# Patient Record
Sex: FEMALE | Race: WHITE | NOT HISPANIC OR LATINO | Employment: OTHER | URBAN - METROPOLITAN AREA
[De-identification: names, ages, dates, MRNs, and addresses within clinical notes are randomized per-mention and may not be internally consistent; named-entity substitution may affect disease eponyms.]

---

## 2018-05-04 LAB — HCV AB SER-ACNC: NEGATIVE

## 2021-04-15 PROBLEM — Z00.00 HEALTHCARE MAINTENANCE: Status: ACTIVE | Noted: 2021-04-15

## 2021-04-16 ENCOUNTER — OFFICE VISIT (OUTPATIENT)
Dept: FAMILY MEDICINE CLINIC | Facility: CLINIC | Age: 66
End: 2021-04-16
Payer: COMMERCIAL

## 2021-04-16 VITALS
WEIGHT: 125 LBS | SYSTOLIC BLOOD PRESSURE: 118 MMHG | BODY MASS INDEX: 20.09 KG/M2 | DIASTOLIC BLOOD PRESSURE: 70 MMHG | TEMPERATURE: 98 F | HEIGHT: 66 IN | HEART RATE: 72 BPM

## 2021-04-16 DIAGNOSIS — D51.9 ANEMIA DUE TO VITAMIN B12 DEFICIENCY, UNSPECIFIED B12 DEFICIENCY TYPE: ICD-10-CM

## 2021-04-16 DIAGNOSIS — Z12.31 ENCOUNTER FOR SCREENING MAMMOGRAM FOR BREAST CANCER: ICD-10-CM

## 2021-04-16 DIAGNOSIS — E03.9 ACQUIRED HYPOTHYROIDISM: Primary | ICD-10-CM

## 2021-04-16 DIAGNOSIS — Z11.59 NEED FOR HEPATITIS C SCREENING TEST: ICD-10-CM

## 2021-04-16 DIAGNOSIS — E78.2 MIXED HYPERLIPIDEMIA: ICD-10-CM

## 2021-04-16 DIAGNOSIS — Z11.4 SCREENING FOR HIV (HUMAN IMMUNODEFICIENCY VIRUS): ICD-10-CM

## 2021-04-16 DIAGNOSIS — Z12.11 COLON CANCER SCREENING: ICD-10-CM

## 2021-04-16 DIAGNOSIS — Z01.419 ENCOUNTER FOR GYNECOLOGICAL EXAMINATION: ICD-10-CM

## 2021-04-16 DIAGNOSIS — M79.10 MYALGIA: ICD-10-CM

## 2021-04-16 DIAGNOSIS — Z78.0 MENOPAUSE: ICD-10-CM

## 2021-04-16 DIAGNOSIS — Z00.00 HEALTHCARE MAINTENANCE: ICD-10-CM

## 2021-04-16 PROCEDURE — 99203 OFFICE O/P NEW LOW 30 MIN: CPT | Performed by: PHYSICIAN ASSISTANT

## 2021-04-16 NOTE — ASSESSMENT & PLAN NOTE
Patient is on Lipitor 10 mg once daily  Her last blood work was in August of last year and therefore we will order fasting lipid panel and CMP

## 2021-04-16 NOTE — PATIENT INSTRUCTIONS
Problem List Items Addressed This Visit        Endocrine    Acquired hypothyroidism - Primary       Other    Hyperlipidemia    Vitamin B12 deficiency anemia    Healthcare maintenance      Other Visit Diagnoses     Myalgia        Menopause        Screening for HIV (human immunodeficiency virus)        Encounter for screening mammogram for breast cancer        Need for hepatitis C screening test

## 2021-04-16 NOTE — PROGRESS NOTES
Assessment and Plan:   I did explain to the patient that when patients are on a statin we check blood work every 6 months  She states her insurance does not cover this but this is likely secondary to miss coating in the past and I am recommending blood work now and again 6 months  Review blood work results initially and then place new orders for 6 months  Problem List Items Addressed This Visit        Endocrine    Acquired hypothyroidism - Primary       Patient is on levothyroxine 75 mg once daily  Her last thyroid was normal August   Repeat TSH  Relevant Orders    TSH, 3rd generation with Free T4 reflex       Other    Hyperlipidemia       Patient is on Lipitor 10 mg once daily  Her last blood work was in August of last year and therefore we will order fasting lipid panel and CMP  Relevant Orders    Comprehensive metabolic panel    Lipid Panel with Direct LDL reflex    Vitamin B12 deficiency anemia       Patient is on oral vitamin-B supplementation  Check vitamin B12 and CBC levels  Relevant Orders    CBC and differential    Vitamin B12    Healthcare maintenance       Patient should have DEXA  Mammogram is up-to-date but due this year  Colon screening done with the fit test through [facility] in May of last year  Patient due for Pap and COVID vaccination  Check vitamin-D level              Other Visit Diagnoses     Myalgia        Relevant Orders    Vitamin D 25 hydroxy    Menopause        Relevant Orders    DXA bone density spine hip and pelvis    Screening for HIV (human immunodeficiency virus)        Relevant Orders    HIV 1/2 Antigen/Antibody (4th Generation) w Reflex [facility]    Encounter for screening mammogram for breast cancer        Relevant Orders    Mammo screening bilateral w 3d & cad    Need for hepatitis C screening test        Colon cancer screening        Relevant Orders    Cologuard    Encounter for gynecological examination        Relevant Orders Ambulatory referral to Gynecology                 Diagnoses and all orders for this visit:    Acquired hypothyroidism  -     TSH, 3rd generation with Free T4 reflex    Mixed hyperlipidemia  -     Comprehensive metabolic panel  -     Lipid Panel with Direct LDL reflex    Anemia due to vitamin B12 deficiency, unspecified B12 deficiency type  -     CBC and differential  -     Vitamin B12; Future    Myalgia  -     Vitamin D 25 hydroxy    Menopause  -     DXA bone density spine hip and pelvis; Future    Screening for HIV (human immunodeficiency virus)  -     HIV 1/2 Antigen/Antibody (4th Generation) w Reflex [facility]; Future    Encounter for screening mammogram for breast cancer  -     Mammo screening bilateral w 3d & cad; Future    Healthcare maintenance    Need for hepatitis C screening test    Colon cancer screening  -     Cologuard; Future    Encounter for gynecological examination  -     Ambulatory referral to Gynecology; Future              Subjective:      Patient ID: Bre Oleary is a 63 y o  female  CC:    Chief Complaint   Patient presents with   Dulce Donis Establish Care     Pt here to establish care with new PCP  She states she has no particular concerns at this time  - [facility]       HPI:      Patient here today as a new patient to be established  She has a history of hypothyroidism and hyperlipidemia on Lipitor as well as vitamin B12 deficiency  It does look like she is due for Pap and COVID vaccinations  She did have blood work in October of 2020 showing normal CBC TSH LDL of 97 and a CMP  Mammogram is up-to-date May of last year fit test was negative through [facility]  She is here today accompanied by her mother  I do not see a prior DEXA scan for check osteoporosis        The following portions of the patient's history were reviewed and updated as appropriate: allergies, current medications, past family history, past medical history, past social history, past surgical history and problem list       Review of Systems   Constitutional: Negative  HENT: Negative  Eyes: Negative  Respiratory: Negative  Cardiovascular: Negative  Gastrointestinal: Negative  Endocrine: Negative  Genitourinary: Negative  Musculoskeletal: Negative  Skin: Negative  Allergic/Immunologic: Negative  Neurological: Negative  Hematological: Negative  Psychiatric/Behavioral: Negative  Data to review:       Objective:    Vitals:    04/16/21 0908   BP: 118/70   BP Location: Left arm   Patient Position: Sitting   Cuff Size: Standard   Pulse: 72   Temp: 98 °F (36 7 °C)   TempSrc: Oral   Weight: 56 7 kg (125 lb)   Height: 5' 5 75" (1 67 m)        Physical Exam  Vitals signs and nursing note reviewed  Constitutional:       Appearance: Normal appearance  She is well-developed  HENT:      Head: Normocephalic and atraumatic  Eyes:      General: Lids are normal       Conjunctiva/sclera: Conjunctivae normal       Pupils: Pupils are equal, round, and reactive to light  Cardiovascular:      Rate and Rhythm: Normal rate and regular rhythm  Heart sounds: No murmur  Pulmonary:      Effort: Pulmonary effort is normal       Breath sounds: Normal breath sounds  Skin:     General: Skin is warm and dry  Neurological:      General: No focal deficit present  Mental Status: She is alert  Coordination: Coordination is intact  Psychiatric:         Mood and Affect: Mood normal          Behavior: Behavior normal  Behavior is cooperative  Thought Content:  Thought content normal          Judgment: Judgment normal

## 2021-04-19 ENCOUNTER — TELEPHONE (OUTPATIENT)
Dept: ADMINISTRATIVE | Facility: OTHER | Age: 66
End: 2021-04-19

## 2021-04-19 NOTE — TELEPHONE ENCOUNTER
----- Message from Cameron Escamilla sent at 4/16/2021  4:02 PM EDT -----  Regarding: colon cancer screen / Nita Said primary care  04/16/21 4:03 PM    Hello, our patient Magy Morris has had CRC: FIT/FOBTx3 completed/performed  Please assist in updating the patient chart by pulling the Care Everywhere (CE) document  The date of service is 5/10/2020       Thank you,  Cameron Escamilla  [facility] [address]

## 2021-04-19 NOTE — TELEPHONE ENCOUNTER
----- Message from Iris Bell sent at 4/16/2021  4:04 PM EDT -----  Regarding: hep c screening / Abdulaziz Phelan primary care  04/16/21 4:04 PM    Hello, our patient Génesis Snyder has had Hepatitis C completed/performed  Please assist in updating the patient chart by pulling the Care Everywhere (CE) document  The date of service is 5/2018       Thank you,  Iris BEATTY [facility] AT [address]

## 2021-04-19 NOTE — TELEPHONE ENCOUNTER
Upon review of the In Basket request we were able to locate, review, and update the patient chart as requested for CRC: FIT/FOBT (3) and Hepatitis C   Any additional questions or concerns should be emailed to the Practice Liaisons via [email or web address] email, please do not reply via In Basket      Thank you  Elham Guardado MA

## 2021-08-23 DIAGNOSIS — E78.2 MIXED HYPERLIPIDEMIA: Primary | ICD-10-CM

## 2021-08-23 RX ORDER — ATORVASTATIN CALCIUM 10 MG/1
10 TABLET, FILM COATED ORAL DAILY
Qty: 30 TABLET | Refills: 1 | Status: SHIPPED | OUTPATIENT
Start: 2021-08-23 | End: 2021-10-21

## 2021-10-20 LAB
25(OH)D3 SERPL-MCNC: 39 NG/ML (ref 30–100)
ALBUMIN SERPL-MCNC: 4.2 G/DL (ref 3.6–5.1)
ALBUMIN/GLOB SERPL: 1.5 (CALC) (ref 1–2.5)
ALP SERPL-CCNC: 92 U/L (ref 37–153)
ALT SERPL-CCNC: 11 U/L (ref 6–29)
AST SERPL-CCNC: 16 U/L (ref 10–35)
BASOPHILS # BLD AUTO: 52 CELLS/UL (ref 0–200)
BASOPHILS NFR BLD AUTO: 0.8 %
BILIRUB SERPL-MCNC: 0.7 MG/DL (ref 0.2–1.2)
BUN SERPL-MCNC: 14 MG/DL (ref 7–25)
BUN/CREAT SERPL: NORMAL (CALC) (ref 6–22)
CALCIUM SERPL-MCNC: 9.3 MG/DL (ref 8.6–10.4)
CHLORIDE SERPL-SCNC: 104 MMOL/L (ref 98–110)
CHOLEST SERPL-MCNC: 178 MG/DL
CHOLEST/HDLC SERPL: 2.7 (CALC)
CO2 SERPL-SCNC: 32 MMOL/L (ref 20–32)
CREAT SERPL-MCNC: 0.55 MG/DL (ref 0.5–0.99)
EOSINOPHIL # BLD AUTO: 260 CELLS/UL (ref 15–500)
EOSINOPHIL NFR BLD AUTO: 4 %
ERYTHROCYTE [DISTWIDTH] IN BLOOD BY AUTOMATED COUNT: 12.1 % (ref 11–15)
GLOBULIN SER CALC-MCNC: 2.8 G/DL (CALC) (ref 1.9–3.7)
GLUCOSE SERPL-MCNC: 95 MG/DL (ref 65–99)
HCT VFR BLD AUTO: 43.9 % (ref 35–45)
HDLC SERPL-MCNC: 66 MG/DL
HGB BLD-MCNC: 14.7 G/DL (ref 11.7–15.5)
HIV 1+2 AB+HIV1 P24 AG SERPL QL IA: NORMAL
LDLC SERPL CALC-MCNC: 94 MG/DL (CALC)
LYMPHOCYTES # BLD AUTO: 1255 CELLS/UL (ref 850–3900)
LYMPHOCYTES NFR BLD AUTO: 19.3 %
MCH RBC QN AUTO: 30.4 PG (ref 27–33)
MCHC RBC AUTO-ENTMCNC: 33.5 G/DL (ref 32–36)
MCV RBC AUTO: 90.9 FL (ref 80–100)
MONOCYTES # BLD AUTO: 702 CELLS/UL (ref 200–950)
MONOCYTES NFR BLD AUTO: 10.8 %
NEUTROPHILS # BLD AUTO: 4232 CELLS/UL (ref 1500–7800)
NEUTROPHILS NFR BLD AUTO: 65.1 %
NONHDLC SERPL-MCNC: 112 MG/DL (CALC)
PLATELET # BLD AUTO: 309 THOUSAND/UL (ref 140–400)
PMV BLD REES-ECKER: 9.5 FL (ref 7.5–12.5)
POTASSIUM SERPL-SCNC: 3.7 MMOL/L (ref 3.5–5.3)
PROT SERPL-MCNC: 7 G/DL (ref 6.1–8.1)
RBC # BLD AUTO: 4.83 MILLION/UL (ref 3.8–5.1)
SL AMB EGFR AFRICAN AMERICAN: 115 ML/MIN/1.73M2
SL AMB EGFR NON AFRICAN AMERICAN: 99 ML/MIN/1.73M2
SODIUM SERPL-SCNC: 143 MMOL/L (ref 135–146)
TRIGL SERPL-MCNC: 89 MG/DL
TSH SERPL-ACNC: 0.89 MIU/L (ref 0.4–4.5)
VIT B12 SERPL-MCNC: 585 PG/ML (ref 200–1100)
WBC # BLD AUTO: 6.5 THOUSAND/UL (ref 3.8–10.8)

## 2021-10-25 ENCOUNTER — OFFICE VISIT (OUTPATIENT)
Dept: FAMILY MEDICINE CLINIC | Facility: CLINIC | Age: 66
End: 2021-10-25
Payer: COMMERCIAL

## 2021-10-25 VITALS
OXYGEN SATURATION: 97 % | BODY MASS INDEX: 21.33 KG/M2 | HEIGHT: 65 IN | HEART RATE: 84 BPM | DIASTOLIC BLOOD PRESSURE: 62 MMHG | SYSTOLIC BLOOD PRESSURE: 128 MMHG | WEIGHT: 128 LBS

## 2021-10-25 DIAGNOSIS — E03.9 ACQUIRED HYPOTHYROIDISM: Primary | ICD-10-CM

## 2021-10-25 DIAGNOSIS — D51.9 ANEMIA DUE TO VITAMIN B12 DEFICIENCY, UNSPECIFIED B12 DEFICIENCY TYPE: ICD-10-CM

## 2021-10-25 DIAGNOSIS — E78.2 MIXED HYPERLIPIDEMIA: ICD-10-CM

## 2021-10-25 PROCEDURE — 99214 OFFICE O/P EST MOD 30 MIN: CPT | Performed by: PHYSICIAN ASSISTANT

## 2021-10-25 RX ORDER — ATORVASTATIN CALCIUM 10 MG/1
10 TABLET, FILM COATED ORAL DAILY
Qty: 90 TABLET | Refills: 3 | Status: SHIPPED | OUTPATIENT
Start: 2021-10-25

## 2021-10-25 RX ORDER — LEVOTHYROXINE SODIUM 0.07 MG/1
75 TABLET ORAL
Qty: 90 TABLET | Refills: 3 | Status: SHIPPED | OUTPATIENT
Start: 2021-10-25

## 2022-05-02 ENCOUNTER — OFFICE VISIT (OUTPATIENT)
Dept: FAMILY MEDICINE CLINIC | Facility: CLINIC | Age: 67
End: 2022-05-02
Payer: COMMERCIAL

## 2022-05-02 VITALS
HEART RATE: 78 BPM | OXYGEN SATURATION: 95 % | BODY MASS INDEX: 21.39 KG/M2 | DIASTOLIC BLOOD PRESSURE: 66 MMHG | WEIGHT: 128.4 LBS | HEIGHT: 65 IN | SYSTOLIC BLOOD PRESSURE: 122 MMHG

## 2022-05-02 DIAGNOSIS — Z12.31 ENCOUNTER FOR SCREENING MAMMOGRAM FOR MALIGNANT NEOPLASM OF BREAST: Primary | ICD-10-CM

## 2022-05-02 DIAGNOSIS — E03.9 ACQUIRED HYPOTHYROIDISM: ICD-10-CM

## 2022-05-02 DIAGNOSIS — Z12.11 SCREENING FOR COLON CANCER: ICD-10-CM

## 2022-05-02 DIAGNOSIS — D51.9 ANEMIA DUE TO VITAMIN B12 DEFICIENCY, UNSPECIFIED B12 DEFICIENCY TYPE: ICD-10-CM

## 2022-05-02 DIAGNOSIS — E78.2 MIXED HYPERLIPIDEMIA: ICD-10-CM

## 2022-05-02 DIAGNOSIS — Z00.00 HEALTHCARE MAINTENANCE: ICD-10-CM

## 2022-05-02 PROCEDURE — 3008F BODY MASS INDEX DOCD: CPT | Performed by: PHYSICIAN ASSISTANT

## 2022-05-02 PROCEDURE — 3725F SCREEN DEPRESSION PERFORMED: CPT | Performed by: PHYSICIAN ASSISTANT

## 2022-05-02 PROCEDURE — 99214 OFFICE O/P EST MOD 30 MIN: CPT | Performed by: PHYSICIAN ASSISTANT

## 2022-05-02 NOTE — ASSESSMENT & PLAN NOTE
Patient on levothyroxine 75 mcg  TSH not done prior to today's visit reprinted order call with results if stable check in 6 months prior to appointment so we can review together

## 2022-05-02 NOTE — ASSESSMENT & PLAN NOTE
Patient thought the Cologuard test kit was to complicated is  she would rather do the fit test kit given today  Mammogram ordered  Patient does not want any gyn examinations

## 2022-05-02 NOTE — PATIENT INSTRUCTIONS
Problem List Items Addressed This Visit     None      Visit Diagnoses     Encounter for screening mammogram for malignant neoplasm of breast    -  Primary    Screening for colon cancer

## 2022-05-02 NOTE — PROGRESS NOTES
Assessment and Plan:  Patient is switching to Medicare in next month and would rather wait to do blood work until then because the cost   Call with results repeat in 6 months  Problem List Items Addressed This Visit        Endocrine    Acquired hypothyroidism     Patient on levothyroxine 75 mcg  TSH not done prior to today's visit reprinted order call with results if stable check in 6 months prior to appointment so we can review together  Relevant Orders    TSH, 3rd generation with Free T4 reflex       Other    Hyperlipidemia     Patient on Lipitor 10 mg once daily blood work was not done prior to today's visit orders were printed call with results to recheck in 6 months  Blood work is to be done before her visits  Relevant Orders    Comprehensive metabolic panel    Lipid Panel with Direct LDL reflex    Vitamin B12 deficiency anemia    Relevant Orders    CBC and differential    Healthcare maintenance     Patient thought the Cologuard test kit was to complicated is  she would rather do the fit test kit given today  Mammogram ordered  Patient does not want any gyn examinations  Other Visit Diagnoses     Encounter for screening mammogram for malignant neoplasm of breast    -  Primary    Relevant Orders    Mammo screening bilateral w 3d & cad    Screening for colon cancer        Relevant Orders    Occult Blood, Fecal Immunochemical                 Diagnoses and all orders for this visit:    Encounter for screening mammogram for malignant neoplasm of breast  -     Mammo screening bilateral w 3d & cad; Future    Screening for colon cancer  -     Occult Blood, Fecal Immunochemical; Future    Acquired hypothyroidism  -     TSH, 3rd generation with Free T4 reflex; Future    Mixed hyperlipidemia  -     Comprehensive metabolic panel; Future  -     Lipid Panel with Direct LDL reflex;  Future    Anemia due to vitamin B12 deficiency, unspecified B12 deficiency type  -     CBC and differential; Future    Healthcare maintenance            Subjective:      Patient ID: Adria Golden is a 61 y o  female  CC:    Chief Complaint   Patient presents with    Follow-up     Patient present today for her 6 month follow up  HPI:      Adria Golden is here for chronic conditions f/u including the diagnosis of Encounter for screening mammogram for malignant neoplasm of breast  (primary encounter diagnosis)  Screening for colon cancer   Pt  states they are taking all medications as directed without complaints or side effects  Patient states she will be going on Medicare next month and had a 70 dollar bill for her blood work the last time so did not go for any blood work prior to today's visit  She would rather wait until she gets her new insurance  The following portions of the patient's history were reviewed and updated as appropriate: allergies, current medications, past family history, past medical history, past social history, past surgical history and problem list       Review of Systems   Constitutional: Negative  HENT: Negative  Eyes: Negative  Respiratory: Negative  Cardiovascular: Negative  Gastrointestinal: Negative  Endocrine: Negative  Genitourinary: Negative  Musculoskeletal: Negative  Skin: Negative  Allergic/Immunologic: Negative  Neurological: Negative  Hematological: Negative  Psychiatric/Behavioral: Negative  Data to review:       Objective:    Vitals:    05/02/22 0809   BP: 122/66   BP Location: Left arm   Patient Position: Sitting   Cuff Size: Large   Pulse: 78   SpO2: 95%   Weight: 58 2 kg (128 lb 6 4 oz)   Height: 5' 5" (1 651 m)        Physical Exam  Vitals and nursing note reviewed  Constitutional:       Appearance: Normal appearance  She is well-developed  HENT:      Head: Normocephalic and atraumatic     Eyes:      General: Lids are normal       Conjunctiva/sclera: Conjunctivae normal       Pupils: Pupils are equal, round, and reactive to light  Cardiovascular:      Rate and Rhythm: Normal rate and regular rhythm  Heart sounds: No murmur heard  Pulmonary:      Effort: Pulmonary effort is normal       Breath sounds: Normal breath sounds  Skin:     General: Skin is warm and dry  Neurological:      General: No focal deficit present  Mental Status: She is alert  Coordination: Coordination is intact  Psychiatric:         Mood and Affect: Mood normal          Behavior: Behavior normal  Behavior is cooperative  Thought Content: Thought content normal          Judgment: Judgment normal              Depression Screening and Follow-up Plan: Patient was screened for depression during today's encounter  They screened negative with a PHQ-2 score of 0

## 2022-05-02 NOTE — ASSESSMENT & PLAN NOTE
Patient on Lipitor 10 mg once daily blood work was not done prior to today's visit orders were printed call with results to recheck in 6 months  Blood work is to be done before her visits

## 2022-08-11 DIAGNOSIS — E78.2 MIXED HYPERLIPIDEMIA: ICD-10-CM

## 2022-08-11 RX ORDER — ATORVASTATIN CALCIUM 10 MG/1
10 TABLET, FILM COATED ORAL DAILY
Qty: 90 TABLET | Refills: 1 | Status: SHIPPED | OUTPATIENT
Start: 2022-08-11 | End: 2022-09-16 | Stop reason: SDUPTHER

## 2022-09-16 ENCOUNTER — OFFICE VISIT (OUTPATIENT)
Dept: FAMILY MEDICINE CLINIC | Facility: CLINIC | Age: 67
End: 2022-09-16
Payer: MEDICARE

## 2022-09-16 VITALS
WEIGHT: 127.2 LBS | TEMPERATURE: 96.8 F | HEART RATE: 70 BPM | DIASTOLIC BLOOD PRESSURE: 70 MMHG | BODY MASS INDEX: 20.44 KG/M2 | SYSTOLIC BLOOD PRESSURE: 130 MMHG | OXYGEN SATURATION: 96 % | HEIGHT: 66 IN

## 2022-09-16 DIAGNOSIS — Z23 NEED FOR PROPHYLACTIC VACCINATION AGAINST STREPTOCOCCUS PNEUMONIAE (PNEUMOCOCCUS): ICD-10-CM

## 2022-09-16 DIAGNOSIS — Z12.11 COLON CANCER SCREENING: ICD-10-CM

## 2022-09-16 DIAGNOSIS — Z12.31 ENCOUNTER FOR SCREENING MAMMOGRAM FOR MALIGNANT NEOPLASM OF BREAST: ICD-10-CM

## 2022-09-16 DIAGNOSIS — E55.9 VITAMIN D DEFICIENCY: ICD-10-CM

## 2022-09-16 DIAGNOSIS — Z12.4 SCREENING FOR CERVICAL CANCER: ICD-10-CM

## 2022-09-16 DIAGNOSIS — E03.9 ACQUIRED HYPOTHYROIDISM: Primary | ICD-10-CM

## 2022-09-16 DIAGNOSIS — E78.2 MIXED HYPERLIPIDEMIA: ICD-10-CM

## 2022-09-16 DIAGNOSIS — Z13.89 SCREENING FOR BLOOD OR PROTEIN IN URINE: ICD-10-CM

## 2022-09-16 DIAGNOSIS — Z11.1 SCREENING FOR TUBERCULOSIS: ICD-10-CM

## 2022-09-16 PROCEDURE — 90677 PCV20 VACCINE IM: CPT

## 2022-09-16 PROCEDURE — 99204 OFFICE O/P NEW MOD 45 MIN: CPT | Performed by: INTERNAL MEDICINE

## 2022-09-16 PROCEDURE — G0009 ADMIN PNEUMOCOCCAL VACCINE: HCPCS

## 2022-09-16 RX ORDER — LEVOTHYROXINE SODIUM 0.07 MG/1
75 TABLET ORAL
Qty: 90 TABLET | Refills: 3 | Status: SHIPPED | OUTPATIENT
Start: 2022-09-16

## 2022-09-16 RX ORDER — ATORVASTATIN CALCIUM 10 MG/1
10 TABLET, FILM COATED ORAL DAILY
Qty: 90 TABLET | Refills: 1 | Status: CANCELLED | OUTPATIENT
Start: 2022-09-16

## 2022-09-16 RX ORDER — ATORVASTATIN CALCIUM 10 MG/1
10 TABLET, FILM COATED ORAL DAILY
Qty: 90 TABLET | Refills: 3 | Status: SHIPPED | OUTPATIENT
Start: 2022-09-16

## 2022-09-16 NOTE — PATIENT INSTRUCTIONS
Pneumococcal 20-Valent Conjugate Vaccine (By injection)   Pneumococcal 20-Valent Vaccine, Diphtheria Conjugate ([id number]-iki-SSM-al 20-VAY-lent VAX-een, dif-THEER-ee-a [id number])  Prevents pneumonia  Brand Name(s): Prevnar 20   There may be other brand names for this medicine  When This Medicine Should Not Be Used: This vaccine is not right for everyone  You should not receive it if you had an allergic reaction to pneumococcal or diphtheria toxoid vaccine  How to Use This Medicine:   Injectable  Your doctor will prescribe your exact dose and tell you how often it should be given  This medicine is given as a shot into one of your muscles  A nurse or other health provider will give you this medicine  Drugs and Foods to Avoid:   Ask your doctor or pharmacist before using any other medicine, including over-the-counter medicines, vitamins, and herbal products  Some medicines can affect how this vaccine works  Tell your doctor if you are receiving a treatment or medicine that can weaken your immune system (including radiation treatment, steroid medicine, or cancer medicine)  Tell your doctor if you have already received another pneumococcal vaccine  Warnings While Using This Medicine:   Tell your doctor if you are pregnant or breastfeeding  Tell your doctor if you have a weak immune system  You may not be fully protected by this vaccine  Possible Side Effects While Using This Medicine:   Call your doctor right away if you notice any of these side effects: Allergic reaction: Itching or hives, swelling in your face or hands, swelling or tingling in your mouth or throat, chest tightness, trouble breathing  Fever  If you notice these less serious side effects, talk with your doctor:   Headache  Joint or muscle pain  Pain, itching, burning, swelling, or a lump under your skin where the shot was given  Tiredness  If you notice other side effects that you think are caused by this medicine, tell your doctor     Call your doctor for medical advice about side effects  You may report side effects to FDA at 0-382-FDA-4417    © Copyright [facility] 2022 Information is for End User's use only and may not be sold, redistributed or otherwise used for commercial purposes  The above information is an  only  It is not intended as medical advice for individual conditions or treatments  Talk to your doctor, nurse or pharmacist before following any medical regimen to see if it is safe and effective for you

## 2022-09-16 NOTE — PROGRESS NOTES
Assessment/Plan:    Acquired hypothyroidism  Continue current dose of levothyroxine, recheck TSH  Hyperlipidemia  Continue with atorvastatin 10 mg daily, recheck lipid panel  Diagnoses and all orders for this visit:    Acquired hypothyroidism  -     TSH, 3rd generation with Free T4 reflex; Future  -     levothyroxine 75 mcg tablet; Take 1 tablet (75 mcg total) by mouth daily in the early morning    Mixed hyperlipidemia  -     Comprehensive metabolic panel; Future  -     Lipid panel; Future  -     atorvastatin (LIPITOR) 10 mg tablet; Take 1 tablet (10 mg total) by mouth daily    Vitamin D deficiency  -     Vitamin D 25 hydroxy; Future    Screening for blood or protein in urine  -     UA (URINE) with reflex to Scope    Encounter for screening mammogram for malignant neoplasm of breast  -     Mammo screening bilateral w 3d & cad; Future    Colon cancer screening  -     Occult Blood, Fecal Immunochemical; Future    Need for prophylactic vaccination against Streptococcus pneumoniae (pneumococcus)  -     Pneumococcal Conjugate Vaccine 20-valent (Pcv20)    Screening for cervical cancer  -     Ambulatory Referral to Gynecology; Future    Screening for tuberculosis  -     XR chest pa & lateral; Future          Subjective:      Patient ID: Tony Vences is a 74 y o  female  Patient came today to establish care  She agreed for follow-up with OBGYN for cervical cancer screening  She refused colonoscopy, she would like to continue with the FIT test   She agreed for mammogram script was given  The following portions of the patient's history were reviewed and updated as appropriate: allergies, current medications, past family history, past medical history, past social history, past surgical history, and problem list     Review of Systems   Constitutional: Negative for activity change, appetite change, chills, fatigue and fever  HENT: Negative for congestion, ear pain, rhinorrhea and sore throat  Respiratory: Negative for cough, shortness of breath and wheezing  Cardiovascular: Negative for chest pain, palpitations and leg swelling  Gastrointestinal: Negative for abdominal distention, abdominal pain, diarrhea, nausea and vomiting  Genitourinary: Negative for difficulty urinating, frequency and pelvic pain  Musculoskeletal: Negative for arthralgias, back pain and neck pain  Skin: Negative for rash  Neurological: Negative for dizziness, tremors, weakness, numbness and headaches  Objective:      /70 (BP Location: Left arm, Patient Position: Sitting, Cuff Size: Adult)   Pulse 70   Temp (!) 96 8 °F (36 °C) (Temporal)   Ht 5' 5 75" (1 67 m)   Wt 57 7 kg (127 lb 3 2 oz)   SpO2 96%   BMI 20 69 kg/m²     Allergies   Allergen Reactions    Grapefruit Extract - Food Allergy Hives and Swelling    Ciprofloxacin      Temporary hearing loss    Other      Grape fruits    Penicillins Rash          Current Outpatient Medications:     atorvastatin (LIPITOR) 10 mg tablet, Take 1 tablet (10 mg total) by mouth daily, Disp: 90 tablet, Rfl: 3    cholecalciferol (VITAMIN D3) 400 units tablet, Take 400 Units by mouth daily, Disp: , Rfl:     cyanocobalamin (VITAMIN B-12) 100 mcg tablet, Take by mouth daily, Disp: , Rfl:     levothyroxine 75 mcg tablet, Take 1 tablet (75 mcg total) by mouth daily in the early morning, Disp: 90 tablet, Rfl: 3     Patient Instructions   Pneumococcal 20-Valent Conjugate Vaccine (By injection)   Pneumococcal 20-Valent Vaccine, Diphtheria Conjugate (GZR-inb-YNG-al 20-VAY-lent VAX-een, dif-THEER-ee-a [id number]-[id number])  Prevents pneumonia  Brand Name(s): Prevnar 20   There may be other brand names for this medicine  When This Medicine Should Not Be Used: This vaccine is not right for everyone  You should not receive it if you had an allergic reaction to pneumococcal or diphtheria toxoid vaccine    How to Use This Medicine:   Injectable  · Your doctor will prescribe your exact dose and tell you how often it should be given  This medicine is given as a shot into one of your muscles  · A nurse or other health provider will give you this medicine  Drugs and Foods to Avoid:   Ask your doctor or pharmacist before using any other medicine, including over-the-counter medicines, vitamins, and herbal products  · Some medicines can affect how this vaccine works  Tell your doctor if you are receiving a treatment or medicine that can weaken your immune system (including radiation treatment, steroid medicine, or cancer medicine)  · Tell your doctor if you have already received another pneumococcal vaccine  Warnings While Using This Medicine:   · Tell your doctor if you are pregnant or breastfeeding  · Tell your doctor if you have a weak immune system  You may not be fully protected by this vaccine  Possible Side Effects While Using This Medicine:   Call your doctor right away if you notice any of these side effects:  · Allergic reaction: Itching or hives, swelling in your face or hands, swelling or tingling in your mouth or throat, chest tightness, trouble breathing  · Fever  If you notice these less serious side effects, talk with your doctor:   · Headache  · Joint or muscle pain  · Pain, itching, burning, swelling, or a lump under your skin where the shot was given  · Tiredness  If you notice other side effects that you think are caused by this medicine, tell your doctor  Call your doctor for medical advice about side effects  You may report side effects to FDA at 2-508-FDA-6648    © Copyright [employer] 2022 Information is for End User's use only and may not be sold, redistributed or otherwise used for commercial purposes  The above information is an  only  It is not intended as medical advice for individual conditions or treatments  Talk to your doctor, nurse or pharmacist before following any medical regimen to see if it is safe and effective for you  Physical Exam  Constitutional:       General: She is not in acute distress  Appearance: Normal appearance  She is not ill-appearing  HENT:      Nose: No rhinorrhea  Cardiovascular:      Rate and Rhythm: Normal rate and regular rhythm  Heart sounds: No murmur heard  No friction rub  No gallop  Pulmonary:      Effort: No respiratory distress  Breath sounds: No wheezing or rhonchi  Chest:      Chest wall: No tenderness  Abdominal:      General: There is no distension  Palpations: There is no mass  Tenderness: There is no abdominal tenderness  There is no guarding or rebound  Hernia: No hernia is present  Musculoskeletal:         General: No swelling or tenderness  Lymphadenopathy:      Cervical: No cervical adenopathy  Skin:     Coloration: Skin is not jaundiced  Findings: No rash  Neurological:      Mental Status: She is alert and oriented to person, place, and time  Motor: No weakness        Gait: Gait normal    Psychiatric:         Mood and Affect: Mood normal          Behavior: Behavior normal

## 2022-10-12 PROBLEM — Z00.00 HEALTHCARE MAINTENANCE: Status: RESOLVED | Noted: 2021-04-15 | Resolved: 2022-10-12

## 2022-11-11 LAB
25(OH)D3 SERPL-MCNC: 41 NG/ML (ref 30–100)
ALBUMIN SERPL-MCNC: 4.2 G/DL (ref 3.6–5.1)
ALBUMIN/GLOB SERPL: 1.5 (CALC) (ref 1–2.5)
ALP SERPL-CCNC: 109 U/L (ref 37–153)
ALT SERPL-CCNC: 8 U/L (ref 6–29)
APPEARANCE UR: CLEAR
AST SERPL-CCNC: 15 U/L (ref 10–35)
BILIRUB SERPL-MCNC: 0.7 MG/DL (ref 0.2–1.2)
BILIRUB UR QL STRIP: NEGATIVE
BUN SERPL-MCNC: 13 MG/DL (ref 7–25)
BUN/CREAT SERPL: NORMAL (CALC) (ref 6–22)
CALCIUM SERPL-MCNC: 9.1 MG/DL (ref 8.6–10.4)
CHLORIDE SERPL-SCNC: 103 MMOL/L (ref 98–110)
CHOLEST SERPL-MCNC: 175 MG/DL
CHOLEST/HDLC SERPL: 2.9 (CALC)
CO2 SERPL-SCNC: 30 MMOL/L (ref 20–32)
COLOR UR: YELLOW
CREAT SERPL-MCNC: 0.55 MG/DL (ref 0.5–1.05)
GFR/BSA.PRED SERPLBLD CYS-BASED-ARV: 102 ML/MIN/1.73M2
GLOBULIN SER CALC-MCNC: 2.8 G/DL (CALC) (ref 1.9–3.7)
GLUCOSE SERPL-MCNC: 89 MG/DL (ref 65–99)
GLUCOSE UR QL STRIP: NEGATIVE
HDLC SERPL-MCNC: 61 MG/DL
HGB UR QL STRIP: NEGATIVE
KETONES UR QL STRIP: NEGATIVE
LDLC SERPL CALC-MCNC: 96 MG/DL (CALC)
LEUKOCYTE ESTERASE UR QL STRIP: NEGATIVE
NITRITE UR QL STRIP: NEGATIVE
NONHDLC SERPL-MCNC: 114 MG/DL (CALC)
PH UR STRIP: 7.5 [PH] (ref 5–8)
POTASSIUM SERPL-SCNC: 3.6 MMOL/L (ref 3.5–5.3)
PROT SERPL-MCNC: 7 G/DL (ref 6.1–8.1)
PROT UR QL STRIP: NEGATIVE
SODIUM SERPL-SCNC: 141 MMOL/L (ref 135–146)
SP GR UR STRIP: 1.02 (ref 1–1.03)
T4 FREE SERPL-MCNC: 1.5 NG/DL (ref 0.8–1.8)
TRIGL SERPL-MCNC: 85 MG/DL
TSH SERPL-ACNC: 0.31 MIU/L (ref 0.4–4.5)

## 2022-11-15 DIAGNOSIS — E03.9 ACQUIRED HYPOTHYROIDISM: Primary | ICD-10-CM

## 2022-11-15 RX ORDER — LEVOTHYROXINE SODIUM 0.07 MG/1
75 TABLET ORAL DAILY
Qty: 30 TABLET | Refills: 0 | Status: SHIPPED | OUTPATIENT
Start: 2022-11-15

## 2023-07-18 ENCOUNTER — OFFICE VISIT (OUTPATIENT)
Dept: FAMILY MEDICINE CLINIC | Facility: CLINIC | Age: 68
End: 2023-07-18
Payer: MEDICARE

## 2023-07-18 VITALS
TEMPERATURE: 98.7 F | OXYGEN SATURATION: 96 % | HEART RATE: 71 BPM | SYSTOLIC BLOOD PRESSURE: 122 MMHG | BODY MASS INDEX: 20.01 KG/M2 | DIASTOLIC BLOOD PRESSURE: 78 MMHG | WEIGHT: 124 LBS

## 2023-07-18 DIAGNOSIS — E03.9 ACQUIRED HYPOTHYROIDISM: Primary | ICD-10-CM

## 2023-07-18 DIAGNOSIS — E78.2 MIXED HYPERLIPIDEMIA: ICD-10-CM

## 2023-07-18 DIAGNOSIS — Z00.00 MEDICARE ANNUAL WELLNESS VISIT, INITIAL: ICD-10-CM

## 2023-07-18 DIAGNOSIS — Z78.0 ASYMPTOMATIC POSTMENOPAUSAL STATE: ICD-10-CM

## 2023-07-18 DIAGNOSIS — Z12.31 ENCOUNTER FOR SCREENING MAMMOGRAM FOR BREAST CANCER: ICD-10-CM

## 2023-07-18 PROCEDURE — 99214 OFFICE O/P EST MOD 30 MIN: CPT | Performed by: FAMILY MEDICINE

## 2023-07-18 PROCEDURE — G0402 INITIAL PREVENTIVE EXAM: HCPCS | Performed by: FAMILY MEDICINE

## 2023-07-18 NOTE — PATIENT INSTRUCTIONS
1. Acquired hypothyroidism    2. Encounter for screening mammogram for breast cancer    3.  Asymptomatic postmenopausal state

## 2023-07-18 NOTE — PROGRESS NOTES
Assessment and Plan:     Problem List Items Addressed This Visit        Endocrine    Acquired hypothyroidism - Primary    Relevant Orders    TSH, 3rd generation with Free T4 reflex       Other    Hyperlipidemia     Continue on statin recheck lipid panel in future        Other Visit Diagnoses     Medicare annual wellness visit, initial        Encounter for screening mammogram for breast cancer        Relevant Orders    Mammo screening bilateral w 3d & cad    Asymptomatic postmenopausal state        Relevant Orders    DXA bone density spine hip and pelvis           Preventive health issues were discussed with patient, and age appropriate screening tests were ordered as noted in patient's After Visit Summary. Personalized health advice and appropriate referrals for health education or preventive services given if needed, as noted in patient's After Visit Summary. Doing walking for exercise    Overall doing well screening tests ordered as well as repeat TSH     History of Present Illness:     Patient presents for a Medicare Wellness Visit    HPI     31-year-old female past medical history of hypothyroidism presenting today for follow-up and for Medicare annual wellness. Reports no changes in health since last visit. Did recommend repeat TSH in 6 weeks as she was unable to do we will order repeat today. Patient Care Team:  Jessa Zhu MD as PCP - General (Internal Medicine)     Review of Systems:     Review of Systems   Constitutional: Negative for activity change and appetite change. Respiratory: Negative for apnea, chest tightness and shortness of breath. Cardiovascular: Negative for chest pain and palpitations. Gastrointestinal: Negative for abdominal distention and abdominal pain. Musculoskeletal: Negative for arthralgias and back pain.         Problem List:     Patient Active Problem List   Diagnosis   • Adolescent idiopathic scoliosis   • Acquired hypothyroidism   • Hyperlipidemia   • Vitamin B12 deficiency anemia      Past Medical and Surgical History:     Past Medical History:   Diagnosis Date   • Hyperlipidemia    • Hypothyroid      Past Surgical History:   Procedure Laterality Date   • SPINE SURGERY      scoliosis repair with kelvin in spine - age 14      Family History:     Family History   Problem Relation Age of Onset   • Thyroid disease Mother    • Coronary artery disease Mother    • Heart disease Father    • Stroke Father       Social History:     Social History     Socioeconomic History   • Marital status: Single     Spouse name: None   • Number of children: None   • Years of education: None   • Highest education level: None   Occupational History   • None   Tobacco Use   • Smoking status: Never   • Smokeless tobacco: Never   Vaping Use   • Vaping Use: Never used   Substance and Sexual Activity   • Alcohol use: Not Currently   • Drug use: Never   • Sexual activity: None   Other Topics Concern   • None   Social History Narrative    Does not consume caffeine     Social Determinants of Health     Financial Resource Strain: Low Risk  (7/18/2023)    Overall Financial Resource Strain (CARDIA)    • Difficulty of Paying Living Expenses: Not hard at all   Food Insecurity: Not on file   Transportation Needs: No Transportation Needs (7/18/2023)    PRAPARE - Transportation    • Lack of Transportation (Medical): No    • Lack of Transportation (Non-Medical):  No   Physical Activity: Not on file   Stress: Not on file   Social Connections: Not on file   Intimate Partner Violence: Not on file   Housing Stability: Not on file      Medications and Allergies:     Current Outpatient Medications   Medication Sig Dispense Refill   • atorvastatin (LIPITOR) 10 mg tablet Take 1 tablet (10 mg total) by mouth daily 90 tablet 3   • cholecalciferol (VITAMIN D3) 400 units tablet Take 400 Units by mouth daily     • cyanocobalamin (VITAMIN B-12) 100 mcg tablet Take by mouth daily     • levothyroxine (Euthyrox) 75 mcg tablet Take 1 tablet (75 mcg total) by mouth daily 30 tablet 0     No current facility-administered medications for this visit. Allergies   Allergen Reactions   • Grapefruit Extract - Food Allergy Hives and Swelling   • Ciprofloxacin      Temporary hearing loss   • Other      Grape fruits   • Penicillins Rash      Immunizations:     Immunization History   Administered Date(s) Administered   • COVID-19 MODERNA VACC 0.5 ML IM 01/28/2021, 02/25/2021, 10/27/2021, 04/25/2022   • COVID-19 Moderna Vac BIVALENT 12 Yr+ IM (BOOSTER ONLY) 0.5 ML 10/28/2022   • INFLUENZA 01/15/2015, 11/22/2019, 09/02/2020, 10/17/2021, 12/10/2022   • Influenza Quadrivalent 3 years and older 11/22/2019, 09/02/2020   • Pneumococcal Conjugate Vaccine 20-valent (Pcv20), Polysace 09/16/2022   • Tdap 01/18/2017   • Zoster 06/27/2017   • Zoster Vaccine Recombinant 07/05/2020, 12/01/2020      Health Maintenance:         Topic Date Due   • Breast Cancer Screening: Mammogram  Never done   • Colorectal Cancer Screening  11/15/2023   • Hepatitis C Screening  Completed         Topic Date Due   • Influenza Vaccine (1) 09/01/2023      Medicare Screening Tests and Risk Assessments: Theresa Puente is here for her Welcome to Medicare visit. Health Risk Assessment:   Patient rates overall health as very good. Patient feels that their physical health rating is same. Patient is satisfied with their life. Eyesight was rated as same. Hearing was rated as same. Patient feels that their emotional and mental health rating is same. Patients states they are sometimes angry. Patient states they are sometimes unusually tired/fatigued. Pain experienced in the last 7 days has been none. Patient states that she has experienced no weight loss or gain in last 6 months. Depression Screening:   PHQ-2 Score: 0      Fall Risk Screening:    In the past year, patient has experienced: history of falling in past year    Number of falls: 1  Injured during fall?: No    Feels unsteady when standing or walking?: No    Worried about falling?: No      Urinary Incontinence Screening:   Patient has not leaked urine accidently in the last six months. Home Safety:  Patient does not have trouble with stairs inside or outside of their home. Patient has working smoke alarms and has working carbon monoxide detector. Home safety hazards include: none. Nutrition:   Current diet is Regular. Medications:   Patient is not currently taking any over-the-counter supplements. Patient is able to manage medications. Activities of Daily Living (ADLs)/Instrumental Activities of Daily Living (IADLs):   Walk and transfer into and out of bed and chair?: Yes  Dress and groom yourself?: Yes    Bathe or shower yourself?: Yes    Feed yourself? Yes  Do your laundry/housekeeping?: Yes  Manage your money, pay your bills and track your expenses?: Yes  Make your own meals?: Yes    Do your own shopping?: Yes    Previous Hospitalizations:   Any hospitalizations or ED visits within the last 12 months?: No      Advance Care Planning:   Living will: Yes    Durable POA for healthcare:  Yes    Advanced directive: Yes    Five wishes given: Yes      Cognitive Screening:   Provider or family/friend/caregiver concerned regarding cognition?: No    PREVENTIVE SCREENINGS      Cardiovascular Screening:    General: Screening Not Indicated and History Lipid Disorder      Diabetes Screening:     General: Screening Current      Colorectal Cancer Screening:     General: Screening Current      Breast Cancer Screening:     General: Risks and Benefits Discussed      Cervical Cancer Screening:    General: Patient Declines    Due for: Cervical Pap Smear      Osteoporosis Screening:    General: Risks and Benefits Discussed      Abdominal Aortic Aneurysm (AAA) Screening:        General: Screening Not Indicated      Lung Cancer Screening:     General: Screening Not Indicated      Hepatitis C Screening:    General: Screening Current      Preventive Screening Comments: Mammogram ordered, patient refereed to gyn in past recommend making appointment, dexa scan ordered has been ordered in past as well    Screening, Brief Intervention, and Referral to Treatment (SBIRT)    Screening  Typical number of drinks in a day: 0  Typical number of drinks in a week: 0  Interpretation: Low risk drinking behavior. Single Item Drug Screening:  How often have you used an illegal drug (including marijuana) or a prescription medication for non-medical reasons in the past year? never    Single Item Drug Screen Score: 0  Interpretation: Negative screen for possible drug use disorder    Vision Screening    Right eye Left eye Both eyes   Without correction 20/50 20/50 20/50   With correction 20/40 20/40 20/40        Physical Exam:     /78 (BP Location: Right arm, Patient Position: Sitting, Cuff Size: Standard)   Pulse 71   Temp 98.7 °F (37.1 °C) (Tympanic)   Wt 56.2 kg (124 lb)   SpO2 96%   BMI 20.01 kg/m²     Physical Exam  Constitutional:       Appearance: Normal appearance. Cardiovascular:      Rate and Rhythm: Normal rate and regular rhythm. Pulses: Normal pulses. Heart sounds: Normal heart sounds. Pulmonary:      Effort: Pulmonary effort is normal.      Breath sounds: Normal breath sounds. Abdominal:      General: Abdomen is flat. Tenderness: There is no abdominal tenderness. Musculoskeletal:         General: Normal range of motion. Neurological:      General: No focal deficit present. Mental Status: She is alert and oriented to person, place, and time.           Ludy Wise MD

## 2023-08-08 ENCOUNTER — TELEPHONE (OUTPATIENT)
Dept: FAMILY MEDICINE CLINIC | Facility: CLINIC | Age: 68
End: 2023-08-08
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
## 2023-08-23 ENCOUNTER — PATIENT MESSAGE (OUTPATIENT)
Dept: FAMILY MEDICINE CLINIC | Facility: CLINIC | Age: 68
End: 2023-08-23

## 2023-08-27 ENCOUNTER — OFFICE VISIT (OUTPATIENT)
Dept: URGENT CARE | Facility: MEDICAL CENTER | Age: 68
End: 2023-08-27
Payer: MEDICARE

## 2023-08-27 VITALS
BODY MASS INDEX: 19.77 KG/M2 | HEIGHT: 66 IN | OXYGEN SATURATION: 96 % | SYSTOLIC BLOOD PRESSURE: 150 MMHG | TEMPERATURE: 97.8 F | DIASTOLIC BLOOD PRESSURE: 74 MMHG | RESPIRATION RATE: 20 BRPM | HEART RATE: 75 BPM | WEIGHT: 123 LBS
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
## 2023-08-27 DIAGNOSIS — S50.862A INSECT BITE OF LEFT FOREARM, INITIAL ENCOUNTER: Primary | ICD-10-CM

## 2023-08-27 PROCEDURE — 99213 OFFICE O/P EST LOW 20 MIN: CPT | Performed by: FAMILY MEDICINE

## 2023-08-27 PROCEDURE — G0463 HOSPITAL OUTPT CLINIC VISIT: HCPCS | Performed by: FAMILY MEDICINE

## 2023-08-27 RX ORDER — TRIAMCINOLONE ACETONIDE 0.25 MG/G
CREAM TOPICAL 2 TIMES DAILY
Qty: 15 G | Refills: 0 | Status: SHIPPED | OUTPATIENT
Start: 2023-08-27

## 2023-08-27 NOTE — PROGRESS NOTES
[facility]        NAME: Sudheer Jeong is a 79 y.o. female  : 1955    MRN: [id number]  DATE: 2023  TIME: 1:52 PM    Assessment and Plan   Insect bite of left forearm, initial encounter [M36.374Y, W57. XXXA]  1. Insect bite of left forearm, initial encounter  triamcinolone (KENALOG) 0.025 % cream        Apply steroid cream sparingly to affected areas    Patient Instructions   Please apply the steroid cream as directed  Use insect repellent when seated outdoors  Avoid scratching the areas  Follow up with PCP in 3-5 days, if not improving or worsen. Chief Complaint     Chief Complaint   Patient presents with   • Insect Bite     Patient noticed insect bites on her L wrist and hand yesterday       History of Present Illness   HPI  Sudheer Jeong is a 79 y.o. female  who presented to the [address] [place] [address] with a history of itchy lesions on her left wrist and forearm. She tried over-the-counter hydrocortisone cream with minimal relief. Patient states that was sitting out side on her porch last night. Review of Systems   Review of Systems   Constitutional: Negative for chills and fever. Gastrointestinal: Negative for nausea and vomiting. Skin: Positive for rash.      Current Medications       Current Outpatient Medications:   •  triamcinolone (KENALOG) 0.025 % cream, Apply topically 2 (two) times a day, Disp: 15 g, Rfl: 0  •  atorvastatin (LIPITOR) 10 mg tablet, Take 1 tablet (10 mg total) by mouth daily, Disp: 90 tablet, Rfl: 3  •  cholecalciferol (VITAMIN D3) 400 units tablet, Take 400 Units by mouth daily, Disp: , Rfl:   •  cyanocobalamin (VITAMIN B-12) 100 mcg tablet, Take by mouth daily, Disp: , Rfl:   •  levothyroxine (Euthyrox) 75 mcg tablet, Take 1 tablet (75 mcg total) by mouth daily, Disp: 30 tablet, Rfl: 0    Current Allergies     Allergies as of 2023 - Reviewed 2023   Allergen Reaction Noted   • Grapefruit extract - food allergy Hives and Swelling 2020 • Ciprofloxacin  10/17/2019   • Other  10/17/2019   • Penicillins Rash 10/17/2019            The following portions of the patient's history were reviewed and updated as appropriate: allergies, current medications, past family history, past medical history, past social history, past surgical history and problem list.     Past Medical History:   Diagnosis Date   • Hyperlipidemia    • Hypothyroid        Past Surgical History:   Procedure Laterality Date   • SPINE SURGERY      scoliosis repair with kelvin in spine - age 14       Family History   Problem Relation Age of Onset   • Thyroid disease Mother    • Coronary artery disease Mother    • Heart disease Father    • Stroke Father      Medications have been verified. Objective   /74   Pulse 75   Temp 97.8 °F (36.6 °C)   Resp 20   Ht 5' 6" (1.676 m)   Wt 55.8 kg (123 lb)   SpO2 96%   BMI 19.85 kg/m²   No LMP recorded. Patient is postmenopausal.       Physical Exam     Physical Exam  Vitals and nursing note reviewed. Constitutional:       Appearance: She is well-developed. HENT:      Head: Normocephalic and atraumatic. Cardiovascular:      Rate and Rhythm: Normal rate. Pulmonary:      Effort: Pulmonary effort is normal. No respiratory distress. Skin:     Findings: Rash present. Rash is papular. Comments: Left forearm/wrist: 4 erythematous papular lesions noted on the anterior and posterior distal forearm and wrist, skin intact, no drainage   Neurological:      Mental Status: She is alert and oriented to person, place, and time.    Psychiatric:         Mood and Affect: Mood normal.         Behavior: Behavior normal.

## 2023-08-27 NOTE — PATIENT INSTRUCTIONS
Please apply steroid cream twice daily to insect bites    Insect Bite or Sting   AMBULATORY CARE:   Most insect bites and stings  are not dangerous and go away without treatment. Common examples of insects that bite or sting are bees, ticks, mosquitoes, spiders, and ants. Insect bites or stings can lead to diseases such as malaria, West Nile virus, Lyme disease, or [place] Spotted Fever. Common signs and symptoms:   Mild symptoms  include a red bump, pain, swelling, and itching. Anaphylaxis symptoms  include throat tightness, trouble breathing, tingling, dizziness, and wheezing. Anaphylaxis is a sudden, life-threatening reaction that needs immediate treatment. Call your local emergency number (911 in the [address]) for signs or symptoms of anaphylaxis,  such as trouble breathing, swelling in your mouth or throat, or wheezing. You may also have itching, a rash, hives, or feel like you are going to faint. Seek care immediately if:   You are stung on your tongue or in your throat. A white area forms around the bite. You are sweating badly or have body pain. You think you were bitten or stung by a poisonous insect. Call your doctor if:   You have a fever. The area becomes red, warm, tender, and swollen beyond the area of the bite or sting. You have questions or concerns about your condition or care. Steps to take for signs or symptoms of anaphylaxis:   Immediately  give 1 shot of epinephrine only into the outer thigh muscle. Leave the shot in place  as directed. Your healthcare provider may recommend you leave it in place for up to 10 seconds before you remove it. This helps make sure all of the epinephrine is delivered. Call 911 and go to the emergency department,  even if the shot improved symptoms. Do not drive yourself. Bring the used epinephrine shot with you. Treatment  depends on how severe your symptoms are and if you had anaphylaxis before.  You may need any of the following:  Antihistamines  decrease mild symptoms such as itching and rash. Epinephrine  is medicine used to treat severe allergic reactions such as anaphylaxis. A tetanus shot  may be given. The shot is a vaccine that helps prevent a bacterial infection. Tetanus booster shots are usually given every 10 years. If an insect bites or stings you:   Remove the stinger. Scrape the stinger out with your fingernail, edge of a credit card, or a knife blade. Do not squeeze the wound. Gently wash the area with soap and water. Remove the tick. Ticks must be removed as soon as possible so you do not get diseases passed through tick bites. Ask your healthcare provider for more information on tick bites and how to remove ticks. Care for your bite or sting wound:   Elevate (raise) the area above the level of your heart, if possible. Prop the area on pillows to keep it raised comfortably. Elevate the area for 10 to 20 minutes each hour or as directed by your healthcare provider. Apply compresses. Soak a clean washcloth in cold water, wring it out, and put it on the bite or sting. Use the compress for 10 to 20 minutes each hour or as directed by your healthcare provider. After 24 to 48 hours, change to warm compresses. Apply a baking soda paste. Add water to baking soda to make a thick paste. Put the paste on the area for 5 minutes. Rinse gently to remove the paste. Safety precautions to take if you are at risk for anaphylaxis:   Keep 2 shots of epinephrine with you at all times. You may need a second shot, because epinephrine only works for about 20 minutes and symptoms may return. Your healthcare provider can show you and family members how to give the shot. Check the expiration date every month and replace it before it expires. Create an action plan. Your healthcare provider can help you create a written plan that explains the allergy and an emergency plan to treat a reaction.  The plan explains when to give a second epinephrine shot if symptoms return or do not improve after the first. Give copies of the action plan and emergency instructions to family members, work and school staff, and  providers. Show them how to give a shot of epinephrine. Carry medical alert identification. Wear medical alert jewelry or carry a card that says you have an insect allergy. Ask your healthcare provider where to get these items. Prevent an insect bite or sting:   Do not wear bright-colored or flower-print clothing when you plan to spend time outdoors. Do not use hairspray, perfumes, or aftershave. Do not leave food out. Empty any standing water. Wash containers with soap and water every 2 days. Put screens on all open windows and doors. Put insect repellent that contains DEET on skin that is showing when you go outside. Put insect repellent at the top of your boots, bottom of pant legs, and sleeve cuffs. Wear long sleeves, pants, and shoes. Use citronella candles outdoors to help keep mosquitoes away. Put a tick and flea collar on pets. Follow up with your doctor as directed:  Write down your questions so you remember to ask them during your visits. © Copyright [facility] 2022 Information is for End User's use only and may not be sold, redistributed or otherwise used for commercial purposes. The above information is an  only. It is not intended as medical advice for individual conditions or treatments. Talk to your doctor, nurse or pharmacist before following any medical regimen to see if it is safe and effective for you.

## 2023-09-01 ENCOUNTER — APPOINTMENT (OUTPATIENT)
Dept: LAB | Facility: MEDICAL CENTER | Age: 68
End: 2023-09-01
Payer: MEDICARE

## 2023-09-01 DIAGNOSIS — E03.9 ACQUIRED HYPOTHYROIDISM: ICD-10-CM

## 2023-09-01 LAB — TSH SERPL DL<=0.05 MIU/L-ACNC: 2.05 UIU/ML (ref 0.45–4.5)

## 2023-09-01 PROCEDURE — 84443 ASSAY THYROID STIM HORMONE: CPT

## 2023-09-01 PROCEDURE — 36415 COLL VENOUS BLD VENIPUNCTURE: CPT

## 2023-09-07 DIAGNOSIS — E03.9 ACQUIRED HYPOTHYROIDISM: ICD-10-CM

## 2023-09-07 RX ORDER — LEVOTHYROXINE SODIUM 0.07 MG/1
75 TABLET ORAL
Qty: 90 TABLET | Refills: 2 | Status: SHIPPED | OUTPATIENT
Start: 2023-09-07

## 2023-09-08 DIAGNOSIS — E78.2 MIXED HYPERLIPIDEMIA: ICD-10-CM

## 2023-09-08 RX ORDER — ATORVASTATIN CALCIUM 10 MG/1
10 TABLET, FILM COATED ORAL DAILY
Qty: 90 TABLET | Refills: 2 | Status: SHIPPED | OUTPATIENT
Start: 2023-09-08

## 2023-09-09 ENCOUNTER — OFFICE VISIT (OUTPATIENT)
Dept: URGENT CARE | Facility: MEDICAL CENTER | Age: 68
End: 2023-09-09
Payer: MEDICARE

## 2023-09-09 VITALS
WEIGHT: 121 LBS | RESPIRATION RATE: 18 BRPM | TEMPERATURE: 100.5 F | BODY MASS INDEX: 19.44 KG/M2 | HEART RATE: 99 BPM | OXYGEN SATURATION: 95 % | HEIGHT: 66 IN

## 2023-09-09 DIAGNOSIS — U07.1 COVID: Primary | ICD-10-CM

## 2023-09-09 DIAGNOSIS — R11.0 NAUSEA: ICD-10-CM

## 2023-09-09 DIAGNOSIS — R52 BODY ACHES: ICD-10-CM

## 2023-09-09 LAB
SARS-COV-2 AG UPPER RESP QL IA: POSITIVE
VALID CONTROL: ABNORMAL

## 2023-09-09 PROCEDURE — 99213 OFFICE O/P EST LOW 20 MIN: CPT | Performed by: ORTHOPAEDIC SURGERY

## 2023-09-09 PROCEDURE — 87811 SARS-COV-2 COVID19 W/OPTIC: CPT | Performed by: ORTHOPAEDIC SURGERY

## 2023-09-09 PROCEDURE — G0463 HOSPITAL OUTPT CLINIC VISIT: HCPCS | Performed by: ORTHOPAEDIC SURGERY

## 2023-09-09 RX ORDER — ONDANSETRON 4 MG/1
4 TABLET, FILM COATED ORAL EVERY 8 HOURS PRN
Qty: 20 TABLET | Refills: 0 | Status: SHIPPED | OUTPATIENT
Start: 2023-09-09 | End: 2023-09-13 | Stop reason: ALTCHOICE

## 2023-09-09 NOTE — PATIENT INSTRUCTIONS
Take zofran as needed for nausea/vomiting  Ibuprofen/Motrin 600mg every 6 hours for fever, headaches, body aches   Ibuprofen is an NSAID. Please stop medication if you experience stomach/abdominal pain and report to your primary care provider. Ask your primary care provider before you take NSAIDs if you are on any blood thinners, or if you have a history of heart disease, kidney disease, gastric bypass surgery, GI bleed, or poorly controlled high blood pressure. May use acetaminophen/Tylenol as directed on the bottle between doses of ibuprofen. Do not exceed 4,000mg of Tylenol a day. Cough:  Guaifenesin/Mucinex as directed on the bottle for congestion and mucous-y cough. Dextromethorphan/Delsym for dry cough and cough suppression   If prescribed, take Tessalon Pearles as directed  Cepacol lozenges, "throat coat" tea for sore throat  Vitamin/Minerals:  Vitamin D3 2,000 IU daily  Vitamin C 1000mg twice a day  Some studies suggest that Zinc 12.5-15mg every 2 hours while awake for 5 days may shorten symptom duration by 1-2 days  Other: Plenty of fluids and rest  Follow up with PCP in 3-5 days  Proceed to ED if symptoms worsen, or if you develop fever, chest pain, shortness of breath/difficulty breathing, altered mental status, dizziness.

## 2023-09-09 NOTE — PROGRESS NOTES
[facility]        NAME: Emil Srivastava is a 79 y.o. female  : 1955    MRN: [id number]  DATE: 2023  TIME: 3:29 PM    Assessment and Plan   COVID [U07.1]  1. COVID        2. Body aches  Poct Covid 19 Rapid Antigen Test      3. Nausea  ondansetron (ZOFRAN) 4 mg tablet         POCT COVID positive. Patient Instructions     1. Take zofran as needed for nausea/vomiting  2. Ibuprofen/Motrin 600mg every 6 hours for fever, headaches, body aches   a. Ibuprofen is an NSAID. Please stop medication if you experience stomach/abdominal pain and report to your primary care provider. b. Ask your primary care provider before you take NSAIDs if you are on any blood thinners, or if you have a history of heart disease, kidney disease, gastric bypass surgery, GI bleed, or poorly controlled high blood pressure. 3. May use acetaminophen/Tylenol as directed on the bottle between doses of ibuprofen. Do not exceed 4,000mg of Tylenol a day. 4. Cough:  a. Guaifenesin/Mucinex as directed on the bottle for congestion and mucous-y cough. b. Dextromethorphan/Delsym for dry cough and cough suppression   c. If prescribed, take Tessalon Pearles as directed  5. Cepacol lozenges, "throat coat" tea for sore throat  6. Vitamin/Minerals:  a. Vitamin D3 2,000 IU daily  b. Vitamin C 1000mg twice a day  c. Some studies suggest that Zinc 12.5-15mg every 2 hours while awake for 5 days may shorten symptom duration by 1-2 days  7. Other: Plenty of fluids and rest  8. Follow up with PCP in 3-5 days  9. Proceed to ED if symptoms worsen, or if you develop fever, chest pain, shortness of breath/difficulty breathing, altered mental status, dizziness.      Chief Complaint     Chief Complaint   Patient presents with   • Abdominal Pain     Patient states she started last night with abd pain and vomited this am.  SHe has a headache and body aches         History of Present Illness       79 YOF presents to the urgent care for evaluation of nausea/vomiting, body aches, headache. Symptoms started yesterday. She denies any fevers or chills, cough, congestion, or sore throat. She has taken Tylenol for symptom relief. This AM she did vomit once. No abdominal pain, diarrhea. She denies any blood in urine or stool, or black/tarry stools. She denies any urinary burning, frequency, urgency. It is also noted that the patient's mother, who also presents here today with the patient, developed similar symptoms today. Review of Systems   Review of Systems   Constitutional: Positive for fatigue. Negative for activity change, chills and fever. HENT: Negative for congestion, ear pain and sore throat. Eyes: Negative for pain and visual disturbance. Respiratory: Negative for cough and shortness of breath. Cardiovascular: Negative for chest pain and palpitations. Gastrointestinal: Positive for nausea and vomiting. Negative for abdominal pain, anal bleeding, blood in stool and diarrhea. Genitourinary: Negative for dysuria, flank pain, frequency, hematuria, pelvic pain, urgency and vaginal discharge. Musculoskeletal: Positive for myalgias. Negative for arthralgias and back pain. Skin: Negative for color change and rash. Neurological: Positive for headaches. Negative for dizziness, seizures, syncope, weakness and light-headedness. Psychiatric/Behavioral: Negative. All other systems reviewed and are negative.         Current Medications       Current Outpatient Medications:   •  ondansetron (ZOFRAN) 4 mg tablet, Take 1 tablet (4 mg total) by mouth every 8 (eight) hours as needed for nausea or vomiting, Disp: 20 tablet, Rfl: 0  •  atorvastatin (LIPITOR) 10 mg tablet, TAKE 1 TABLET BY MOUTH EVERY DAY, Disp: 90 tablet, Rfl: 2  •  cholecalciferol (VITAMIN D3) 400 units tablet, Take 400 Units by mouth daily, Disp: , Rfl:   •  cyanocobalamin (VITAMIN B-12) 100 mcg tablet, Take by mouth daily, Disp: , Rfl:   •  levothyroxine 75 mcg tablet, TAKE 1 TABLET (75 MCG TOTAL) BY MOUTH DAILY IN THE EARLY MORNING, Disp: 90 tablet, Rfl: 2  •  triamcinolone (KENALOG) 0.025 % cream, Apply topically 2 (two) times a day, Disp: 15 g, Rfl: 0    Current Allergies     Allergies as of 09/09/2023 - Reviewed 09/09/2023   Allergen Reaction Noted   • Grapefruit extract - food allergy Hives and Swelling 05/08/2020   • Ciprofloxacin  10/17/2019   • Other  10/17/2019   • Penicillins Rash 10/17/2019            The following portions of the patient's history were reviewed and updated as appropriate: allergies, current medications, past family history, past medical history, past social history, past surgical history and problem list.     Past Medical History:   Diagnosis Date   • Hyperlipidemia    • Hypothyroid        Past Surgical History:   Procedure Laterality Date   • SPINE SURGERY      scoliosis repair with kelvin in spine - age 14       Family History   Problem Relation Age of Onset   • Thyroid disease Mother    • Coronary artery disease Mother    • Heart disease Father    • Stroke Father          Medications have been verified. Objective   Pulse 99   Temp 100.5 °F (38.1 °C)   Resp 18   Ht 5' 6" (1.676 m)   Wt 54.9 kg (121 lb)   SpO2 95%   BMI 19.53 kg/m²        Physical Exam     Physical Exam  Vitals and nursing note reviewed. Constitutional:       General: She is not in acute distress. Appearance: Normal appearance. She is well-developed and normal weight. She is not ill-appearing or toxic-appearing. HENT:      Head: Normocephalic and atraumatic. Nose: Nose normal.      Mouth/Throat:      Pharynx: Oropharynx is clear. Eyes:      Extraocular Movements: Extraocular movements intact. Pupils: Pupils are equal, round, and reactive to light. Cardiovascular:      Rate and Rhythm: Normal rate and regular rhythm. Pulses: Normal pulses. Heart sounds: Normal heart sounds. No murmur heard.   Pulmonary:      Effort: Pulmonary effort is normal. No respiratory distress. Breath sounds: Normal breath sounds. Abdominal:      General: Abdomen is flat. Bowel sounds are normal.      Palpations: Abdomen is soft. Tenderness: There is no abdominal tenderness. There is no right CVA tenderness, left CVA tenderness or rebound. Negative signs include Sullivan's sign and Rovsing's sign. Musculoskeletal:      Cervical back: Normal range of motion. Skin:     General: Skin is warm and dry. Capillary Refill: Capillary refill takes less than 2 seconds. Neurological:      General: No focal deficit present. Mental Status: She is alert and oriented to person, place, and time.    Psychiatric:         Mood and Affect: Mood normal.         Behavior: Behavior normal.

## 2023-09-13 ENCOUNTER — TELEMEDICINE (OUTPATIENT)
Dept: FAMILY MEDICINE CLINIC | Facility: CLINIC | Age: 68
End: 2023-09-13
Payer: MEDICARE

## 2023-09-13 VITALS
OXYGEN SATURATION: 97 % | HEART RATE: 76 BPM | TEMPERATURE: 98.2 F | DIASTOLIC BLOOD PRESSURE: 76 MMHG | SYSTOLIC BLOOD PRESSURE: 127 MMHG

## 2023-09-13 DIAGNOSIS — M54.9 ACUTE UPPER BACK PAIN: ICD-10-CM

## 2023-09-13 DIAGNOSIS — U07.1 COVID-19: Primary | ICD-10-CM

## 2023-09-13 PROCEDURE — 99442 PR PHYS/QHP TELEPHONE EVALUATION 11-20 MIN: CPT | Performed by: FAMILY MEDICINE

## 2023-09-13 RX ORDER — METHOCARBAMOL 500 MG/1
500 TABLET, FILM COATED ORAL 3 TIMES DAILY
Qty: 30 TABLET | Refills: 1 | Status: SHIPPED | OUTPATIENT
Start: 2023-09-13

## 2023-09-13 NOTE — PROGRESS NOTES
COVID-19 Outpatient Progress Note    Assessment/Plan:    Problem List Items Addressed This Visit        Other    Acute upper back pain    Relevant Medications    methocarbamol (ROBAXIN) 500 mg tablet   Other Visit Diagnoses     COVID-19    -  Primary         Disposition:     Discussed symptom directed medication options with patient. The patient's for symptoms arose about 5 days ago. She was seen in urgent care and tested positive. She declined Paxlovid at this time. Fortunately, symptoms are quite mild except for yesterday she started developing pain in her upper back potentially from coughing previously. This pain can be quite severe and gets worse if she takes a deep breath. She denies any chest pain. She had similar pain several years ago and was treated with Robaxin which worked quite well for her without significant side effects. I have spent a total time of 15 minutes on the day of the encounter for this patient including risks and benefits of treatment options. The patient declines Paxlovid at this time. She is having some upper back pain and we are going to try Robaxin which has worked in the past.  She agrees to contact us if this gets any worse. She also may take Tylenol. She notes she typically avoids NSAIDs. Encounter provider: Diane Segura MD     Provider located at: [address]  [facility] [address] [facility] [address] [phone]     Recent Visits  No visits were found meeting these conditions. Showing recent visits within past 7 days and meeting all other requirements  Today's Visits  Date Type Provider Dept   09/13/23 Telemedicine Diane Fulton MD [facility]   Showing today's visits and meeting all other requirements  Future Appointments  No visits were found meeting these conditions.   Showing future appointments within next 150 days and meeting all other requirements Patient agrees to participate in a virtual check in via telephone or video visit instead of presenting to the office to address urgent/immediate medical needs. Patient is aware this is a billable service. She acknowledged consent and understanding of privacy and security of the video platform. The patient has agreed to participate and understands they can discontinue the visit at any time. After connecting through [facility], the patient was identified by name and date of birth. Claudia Newman was informed that this was a telemedicine visit and that the exam was being conducted confidentially over secure lines. Claudia Newman acknowledged consent and understanding of privacy and security of the telemedicine visit. I informed the patient that I have reviewed her record in Epic and presented the opportunity for her to ask any questions regarding the visit today. The patient agreed to participate. Verification of patient location:  Patient is located in the following state in which I hold an active license: [place]    Subjective: Claudia Newman is a 79 y.o. female who is concerned about COVID-19. Patient's symptoms include cough (mild). Patient denies fever, chills, fatigue, congestion, rhinorrhea, sore throat, anosmia, loss of taste, shortness of breath, chest tightness, abdominal pain, nausea, vomiting, diarrhea, myalgias and headaches. - Date of symptom onset: 9/8/2023      COVID-19 vaccination status: Fully vaccinated with booster    Lab Results   Component Value Date    SARSCOVAG Positive (A) 09/09/2023       Review of Systems   Constitutional: Negative for chills, fatigue and fever. HENT: Negative for congestion, rhinorrhea and sore throat. Respiratory: Positive for cough (mild). Negative for chest tightness and shortness of breath. Gastrointestinal: Negative for abdominal pain, diarrhea, nausea and vomiting. Musculoskeletal: Positive for arthralgias and back pain. Negative for myalgias. Neurological: Negative for headaches.      Current Outpatient Medications on File Prior to Visit   Medication Sig   • atorvastatin (LIPITOR) 10 mg tablet TAKE 1 TABLET BY MOUTH EVERY DAY   • cholecalciferol (VITAMIN D3) 400 units tablet Take 400 Units by mouth daily   • cyanocobalamin (VITAMIN B-12) 100 mcg tablet Take by mouth daily   • levothyroxine 75 mcg tablet TAKE 1 TABLET (75 MCG TOTAL) BY MOUTH DAILY IN THE EARLY MORNING   • [DISCONTINUED] ondansetron (ZOFRAN) 4 mg tablet Take 1 tablet (4 mg total) by mouth every 8 (eight) hours as needed for nausea or vomiting   • [DISCONTINUED] triamcinolone (KENALOG) 0.025 % cream Apply topically 2 (two) times a day       Objective:    /76   Pulse 76   Temp 98.2 °F (36.8 °C)   SpO2 97%        Physical Exam   Video was not working on Martina Cagle MD

## 2023-10-11 ENCOUNTER — TELEPHONE (OUTPATIENT)
Dept: FAMILY MEDICINE CLINIC | Facility: CLINIC | Age: 68
End: 2023-10-11

## 2024-01-10 ENCOUNTER — PATIENT MESSAGE (OUTPATIENT)
Dept: FAMILY MEDICINE CLINIC | Facility: CLINIC | Age: 69
End: 2024-01-10

## 2024-01-18 ENCOUNTER — APPOINTMENT (EMERGENCY)
Dept: CT IMAGING | Facility: HOSPITAL | Age: 69
End: 2024-01-18
Payer: MEDICARE

## 2024-01-18 ENCOUNTER — HOSPITAL ENCOUNTER (EMERGENCY)
Facility: HOSPITAL | Age: 69
Discharge: HOME | End: 2024-01-18
Attending: EMERGENCY MEDICINE
Payer: MEDICARE

## 2024-01-18 VITALS
SYSTOLIC BLOOD PRESSURE: 112 MMHG | RESPIRATION RATE: 19 BRPM | WEIGHT: 125.44 LBS | BODY MASS INDEX: 20.25 KG/M2 | OXYGEN SATURATION: 96 % | HEART RATE: 57 BPM | DIASTOLIC BLOOD PRESSURE: 58 MMHG | TEMPERATURE: 98 F

## 2024-01-18 DIAGNOSIS — N28.1 RENAL CYST: ICD-10-CM

## 2024-01-18 DIAGNOSIS — N20.1 URETEROLITHIASIS: Primary | ICD-10-CM

## 2024-01-18 DIAGNOSIS — K76.89 HEPATIC CYST: ICD-10-CM

## 2024-01-18 DIAGNOSIS — K80.20 CALCULUS OF GALLBLADDER WITHOUT CHOLECYSTITIS WITHOUT OBSTRUCTION: ICD-10-CM

## 2024-01-18 DIAGNOSIS — M41.9 SCOLIOSIS OF THORACOLUMBAR SPINE: ICD-10-CM

## 2024-01-18 DIAGNOSIS — R10.9 LEFT FLANK PAIN: ICD-10-CM

## 2024-01-18 LAB
ALBUMIN SERPL BCP-MCNC: 3.9 G/DL (ref 3.5–5)
ALP SERPL-CCNC: 70 U/L (ref 34–104)
ALT SERPL W P-5'-P-CCNC: 12 U/L (ref 7–52)
ANION GAP SERPL CALCULATED.3IONS-SCNC: 9 MMOL/L
AST SERPL W P-5'-P-CCNC: 19 U/L (ref 13–39)
ATRIAL RATE: 67 BPM
BACTERIA UR QL AUTO: ABNORMAL /HPF
BASOPHILS # BLD AUTO: 0.03 THOUSANDS/ÂΜL (ref 0–0.1)
BASOPHILS NFR BLD AUTO: 0 % (ref 0–1)
BILIRUB SERPL-MCNC: 0.58 MG/DL (ref 0.2–1)
BILIRUB UR QL STRIP: NEGATIVE
BUDDING YEAST: PRESENT
BUN SERPL-MCNC: 16 MG/DL (ref 5–25)
CALCIUM SERPL-MCNC: 8.6 MG/DL (ref 8.4–10.2)
CHLORIDE SERPL-SCNC: 104 MMOL/L (ref 96–108)
CLARITY UR: CLEAR
CO2 SERPL-SCNC: 25 MMOL/L (ref 21–32)
COLOR UR: ABNORMAL
CREAT SERPL-MCNC: 0.62 MG/DL (ref 0.6–1.3)
EOSINOPHIL # BLD AUTO: 0.15 THOUSAND/ÂΜL (ref 0–0.61)
EOSINOPHIL NFR BLD AUTO: 2 % (ref 0–6)
ERYTHROCYTE [DISTWIDTH] IN BLOOD BY AUTOMATED COUNT: 11.7 % (ref 11.6–15.1)
GFR SERPL CREATININE-BSD FRML MDRD: 94 ML/MIN/1.73SQ M
GLUCOSE SERPL-MCNC: 176 MG/DL (ref 65–140)
GLUCOSE UR STRIP-MCNC: NEGATIVE MG/DL
HCT VFR BLD AUTO: 41 % (ref 34.8–46.1)
HGB BLD-MCNC: 13.6 G/DL (ref 11.5–15.4)
HGB UR QL STRIP.AUTO: ABNORMAL
IMM GRANULOCYTES # BLD AUTO: 0.03 THOUSAND/UL (ref 0–0.2)
IMM GRANULOCYTES NFR BLD AUTO: 0 % (ref 0–2)
KETONES UR STRIP-MCNC: ABNORMAL MG/DL
LEUKOCYTE ESTERASE UR QL STRIP: NEGATIVE
LIPASE SERPL-CCNC: 8 U/L (ref 11–82)
LYMPHOCYTES # BLD AUTO: 0.95 THOUSANDS/ÂΜL (ref 0.6–4.47)
LYMPHOCYTES NFR BLD AUTO: 12 % (ref 14–44)
MCH RBC QN AUTO: 30.9 PG (ref 26.8–34.3)
MCHC RBC AUTO-ENTMCNC: 33.2 G/DL (ref 31.4–37.4)
MCV RBC AUTO: 93 FL (ref 82–98)
MONOCYTES # BLD AUTO: 0.57 THOUSAND/ÂΜL (ref 0.17–1.22)
MONOCYTES NFR BLD AUTO: 7 % (ref 4–12)
MUCOUS THREADS UR QL AUTO: ABNORMAL
NEUTROPHILS # BLD AUTO: 6.16 THOUSANDS/ÂΜL (ref 1.85–7.62)
NEUTS SEG NFR BLD AUTO: 79 % (ref 43–75)
NITRITE UR QL STRIP: NEGATIVE
NON-SQ EPI CELLS URNS QL MICRO: ABNORMAL /HPF
NRBC BLD AUTO-RTO: 0 /100 WBCS
P AXIS: 77 DEGREES
PH UR STRIP.AUTO: 7 [PH] (ref 4.5–8)
PLATELET # BLD AUTO: 260 THOUSANDS/UL (ref 149–390)
PMV BLD AUTO: 8.8 FL (ref 8.9–12.7)
POTASSIUM SERPL-SCNC: 3.8 MMOL/L (ref 3.5–5.3)
PR INTERVAL: 260 MS
PROT SERPL-MCNC: 6.8 G/DL (ref 6.4–8.4)
PROT UR STRIP-MCNC: ABNORMAL MG/DL
QRS AXIS: 93 DEGREES
QRSD INTERVAL: 90 MS
QT INTERVAL: 470 MS
QTC INTERVAL: 496 MS
RBC # BLD AUTO: 4.4 MILLION/UL (ref 3.81–5.12)
RBC #/AREA URNS AUTO: ABNORMAL /HPF
SODIUM SERPL-SCNC: 138 MMOL/L (ref 135–147)
SP GR UR STRIP.AUTO: 1.01 (ref 1–1.03)
T WAVE AXIS: 70 DEGREES
UROBILINOGEN UR QL STRIP.AUTO: 1 E.U./DL
VENTRICULAR RATE: 67 BPM
WBC # BLD AUTO: 7.89 THOUSAND/UL (ref 4.31–10.16)
WBC #/AREA URNS AUTO: ABNORMAL /HPF

## 2024-01-18 PROCEDURE — 93010 ELECTROCARDIOGRAM REPORT: CPT | Performed by: STUDENT IN AN ORGANIZED HEALTH CARE EDUCATION/TRAINING PROGRAM

## 2024-01-18 PROCEDURE — 74177 CT ABD & PELVIS W/CONTRAST: CPT

## 2024-01-18 PROCEDURE — 96374 THER/PROPH/DIAG INJ IV PUSH: CPT

## 2024-01-18 PROCEDURE — 81001 URINALYSIS AUTO W/SCOPE: CPT

## 2024-01-18 PROCEDURE — 99284 EMERGENCY DEPT VISIT MOD MDM: CPT

## 2024-01-18 PROCEDURE — 83690 ASSAY OF LIPASE: CPT | Performed by: EMERGENCY MEDICINE

## 2024-01-18 PROCEDURE — 85025 COMPLETE CBC W/AUTO DIFF WBC: CPT | Performed by: EMERGENCY MEDICINE

## 2024-01-18 PROCEDURE — 93005 ELECTROCARDIOGRAM TRACING: CPT

## 2024-01-18 PROCEDURE — 87086 URINE CULTURE/COLONY COUNT: CPT

## 2024-01-18 PROCEDURE — 99285 EMERGENCY DEPT VISIT HI MDM: CPT | Performed by: EMERGENCY MEDICINE

## 2024-01-18 PROCEDURE — 36415 COLL VENOUS BLD VENIPUNCTURE: CPT | Performed by: EMERGENCY MEDICINE

## 2024-01-18 PROCEDURE — 80053 COMPREHEN METABOLIC PANEL: CPT | Performed by: EMERGENCY MEDICINE

## 2024-01-18 PROCEDURE — G1004 CDSM NDSC: HCPCS

## 2024-01-18 PROCEDURE — 96375 TX/PRO/DX INJ NEW DRUG ADDON: CPT

## 2024-01-18 PROCEDURE — 96361 HYDRATE IV INFUSION ADD-ON: CPT

## 2024-01-18 RX ORDER — KETOROLAC TROMETHAMINE 30 MG/ML
1 INJECTION, SOLUTION INTRAMUSCULAR; INTRAVENOUS ONCE
Status: COMPLETED | OUTPATIENT
Start: 2024-01-18 | End: 2024-01-18

## 2024-01-18 RX ORDER — ONDANSETRON 2 MG/ML
4 INJECTION INTRAMUSCULAR; INTRAVENOUS ONCE
Status: COMPLETED | OUTPATIENT
Start: 2024-01-18 | End: 2024-01-18

## 2024-01-18 RX ADMIN — SODIUM CHLORIDE 1000 ML: 0.9 INJECTION, SOLUTION INTRAVENOUS at 02:57

## 2024-01-18 RX ADMIN — ONDANSETRON 4 MG: 2 INJECTION INTRAMUSCULAR; INTRAVENOUS at 03:05

## 2024-01-18 RX ADMIN — IOHEXOL 100 ML: 350 INJECTION, SOLUTION INTRAVENOUS at 03:56

## 2024-01-18 RX ADMIN — MORPHINE SULFATE 2 MG: 2 INJECTION, SOLUTION INTRAMUSCULAR; INTRAVENOUS at 03:05

## 2024-01-18 NOTE — ED PROVIDER NOTES
History  Chief Complaint   Patient presents with    Flank Pain     Patient arrived via ems from home with complaints of left sided flank pain.  Patient states having hx of kidney stones.     68-year-old female presents with an abrupt onset of left flank pain that woke her from sleep.  Radiates to the left upper quadrant.  Has had nausea associated with this.  No other symptoms such as fevers, chills, dysuria but does state that her urine was darker tonight in color.      History provided by:  Patient   used: No    Flank Pain  Associated symptoms: nausea    Associated symptoms: no chest pain, no chills, no constipation, no cough, no diarrhea, no dysuria, no fever, no hematuria and no shortness of breath        Prior to Admission Medications   Prescriptions Last Dose Informant Patient Reported? Taking?   atorvastatin (LIPITOR) 10 mg tablet   No Yes   Sig: TAKE 1 TABLET BY MOUTH EVERY DAY   cholecalciferol (VITAMIN D3) 400 units tablet  Self Yes Yes   Sig: Take 400 Units by mouth daily   cyanocobalamin (VITAMIN B-12) 100 mcg tablet  Self Yes Yes   Sig: Take by mouth daily   levothyroxine 75 mcg tablet   No Yes   Sig: TAKE 1 TABLET (75 MCG TOTAL) BY MOUTH DAILY IN THE EARLY MORNING   methocarbamol (ROBAXIN) 500 mg tablet   No No   Sig: Take 1 tablet (500 mg total) by mouth 3 (three) times a day      Facility-Administered Medications: None       Past Medical History:   Diagnosis Date    Hyperlipidemia     Hypothyroid        Past Surgical History:   Procedure Laterality Date    SPINE SURGERY      scoliosis repair with kelvin in spine - age 18       Family History   Problem Relation Age of Onset    Thyroid disease Mother     Coronary artery disease Mother     Heart disease Father     Stroke Father      I have reviewed and agree with the history as documented.    E-Cigarette/Vaping    E-Cigarette Use Never User      E-Cigarette/Vaping Substances    Nicotine No     THC No     CBD No     Flavoring No      Other No     Unknown No      Social History     Tobacco Use    Smoking status: Never    Smokeless tobacco: Never   Vaping Use    Vaping status: Never Used   Substance Use Topics    Alcohol use: Not Currently    Drug use: Never       Review of Systems   Constitutional:  Negative for chills, diaphoresis and fever.   Respiratory:  Negative for cough, chest tightness and shortness of breath.    Cardiovascular:  Negative for chest pain.   Gastrointestinal:  Positive for abdominal pain and nausea. Negative for abdominal distention, blood in stool, constipation and diarrhea.   Genitourinary:  Positive for flank pain. Negative for difficulty urinating, dysuria, frequency, hematuria and urgency.   Skin:  Negative for color change, pallor, rash and wound.   Allergic/Immunologic: Negative for immunocompromised state.   Neurological:  Negative for dizziness, light-headedness and headaches.   All other systems reviewed and are negative.      Physical Exam  Physical Exam  Vitals and nursing note reviewed.   Constitutional:       General: She is in acute distress.      Appearance: She is well-developed. She is not ill-appearing, toxic-appearing or diaphoretic.   HENT:      Head: Normocephalic and atraumatic.      Right Ear: External ear normal.      Left Ear: External ear normal.      Nose: Nose normal. No congestion or rhinorrhea.   Eyes:      General: No scleral icterus.        Right eye: No discharge.         Left eye: No discharge.      Conjunctiva/sclera: Conjunctivae normal.   Neck:      Trachea: No tracheal deviation.   Cardiovascular:      Rate and Rhythm: Normal rate and regular rhythm.      Pulses: Normal pulses.   Pulmonary:      Effort: Pulmonary effort is normal. No tachypnea, accessory muscle usage or respiratory distress.      Breath sounds: No stridor.   Abdominal:      General: There is no distension.      Palpations: Abdomen is soft.      Tenderness: There is no abdominal tenderness. There is no guarding.    Skin:     General: Skin is warm and dry.   Neurological:      General: No focal deficit present.      Mental Status: She is alert and oriented to person, place, and time. She is not disoriented.      Gait: Gait normal.   Psychiatric:         Speech: Speech normal.         Behavior: Behavior normal.         Vital Signs  ED Triage Vitals   Temperature Pulse Respirations Blood Pressure SpO2   01/18/24 0224 01/18/24 0223 01/18/24 0223 01/18/24 0223 01/18/24 0223   98 °F (36.7 °C) 57 19 112/58 96 %      Temp Source Heart Rate Source Patient Position - Orthostatic VS BP Location FiO2 (%)   01/18/24 0223 -- -- -- --   Oral          Pain Score       01/18/24 0223       6           Vitals:    01/18/24 0223   BP: 112/58   Pulse: 57         Visual Acuity      ED Medications  Medications   ketorolac (FOR EMS ONLY) (TORADOL) injection 30 mg (0 mg Does not apply Given to EMS 1/18/24 0222)   sodium chloride 0.9 % bolus 1,000 mL (0 mL Intravenous Stopped 1/18/24 0359)   ondansetron (ZOFRAN) injection 4 mg (4 mg Intravenous Given 1/18/24 0305)   morphine injection 2 mg (2 mg Intravenous Given 1/18/24 0305)   iohexol (OMNIPAQUE) 350 MG/ML injection (MULTI-DOSE) 100 mL (100 mL Intravenous Given 1/18/24 0356)       Diagnostic Studies  Results Reviewed       Procedure Component Value Units Date/Time    Urine Microscopic [[id number]]  (Abnormal) Collected: 01/18/24 0440    Lab Status: Final result Specimen: Urine, Clean Catch Updated: 01/18/24 0528     RBC, UA Innumerable /hpf      WBC, UA 30-50 /hpf      Epithelial Cells None Seen /hpf      Bacteria, UA None Seen /hpf      MUCUS THREADS Moderate     Budding Yeast Present    Urine culture [[id number]] Collected: 01/18/24 0440    Lab Status: In process Specimen: Urine, Clean Catch Updated: 01/18/24 0528    Urine Macroscopic, POC [[id number]]  (Abnormal) Collected: 01/18/24 0440    Lab Status: Final result Specimen: Urine Updated: 01/18/24 0442     Color, UA Bloody     Clarity, UA Clear      pH, UA 7.0     Leukocytes, UA Negative     Nitrite, UA Negative     Protein, UA 30 (1+) mg/dl      Glucose, UA Negative mg/dl      Ketones, UA 15 (1+) mg/dl      Urobilinogen, UA 1.0 E.U./dl      Bilirubin, UA Negative     Occult Blood, UA Large     Specific Gravity, UA 1.015    Narrative:      CLINITEK RESULT    Comprehensive metabolic panel [[id number]]  (Abnormal) Collected: 01/18/24 0252    Lab Status: Final result Specimen: Blood from Arm, Left Updated: 01/18/24 0318     Sodium 138 mmol/L      Potassium 3.8 mmol/L      Chloride 104 mmol/L      CO2 25 mmol/L      ANION GAP 9 mmol/L      BUN 16 mg/dL      Creatinine 0.62 mg/dL      Glucose 176 mg/dL      Calcium 8.6 mg/dL      AST 19 U/L      ALT 12 U/L      Alkaline Phosphatase 70 U/L      Total Protein 6.8 g/dL      Albumin 3.9 g/dL      Total Bilirubin 0.58 mg/dL      eGFR 94 ml/min/1.73sq m     Narrative:      National Kid[employer] Disease Foundation guidelines for Chronic Kidney Disease (CKD):     Stage 1 with normal or high GFR (GFR > 90 mL/min/1.73 square meters)    Stage 2 Mild CKD (GFR = 60-89 mL/min/1.73 square meters)    Stage 3A Moderate CKD (GFR = 45-59 mL/min/1.73 square meters)    Stage 3B Moderate CKD (GFR = 30-44 mL/min/1.73 square meters)    Stage 4 Severe CKD (GFR = 15-29 mL/min/1.73 square meters)    Stage 5 End Stage CKD (GFR <15 mL/min/1.73 square meters)  Note: GFR calculation is accurate only with a steady state creatinine    Lipase [[id number]]  (Abnormal) Collected: 01/18/24 0252    Lab Status: Final result Specimen: Blood from Arm, Left Updated: 01/18/24 0318     Lipase 8 u/L     CBC and differential [[id number]]  (Abnormal) Collected: 01/18/24 0252    Lab Status: Final result Specimen: Blood from Arm, Left Updated: 01/18/24 0303     WBC 7.89 Thousand/uL      RBC 4.40 Million/uL      Hemoglobin 13.6 g/dL      Hematocrit 41.0 %      MCV 93 fL      MCH 30.9 pg      MCHC 33.2 g/dL      RDW 11.7 %      MPV 8.8 fL      Platelets 260 Thousands/uL       nRBC 0 /100 WBCs      Neutrophils Relative 79 %      Immat GRANS % 0 %      Lymphocytes Relative 12 %      Monocytes Relative 7 %      Eosinophils Relative 2 %      Basophils Relative 0 %      Neutrophils Absolute 6.16 Thousands/µL      Immature Grans Absolute 0.03 Thousand/uL      Lymphocytes Absolute 0.95 Thousands/µL      Monocytes Absolute 0.57 Thousand/µL      Eosinophils Absolute 0.15 Thousand/µL      Basophils Absolute 0.03 Thousands/µL                    CT abdomen pelvis with contrast   Final Result by Lorna Covarrubias MD (01/18 0506)   Addendum (preliminary) 1 of 1 by Lorna Covarrubias MD (01/18 0506)   ADDENDUM:      3 mm calculus at the left ureteropelvic junction causing mild    hydronephrosis.      I personally discussed this study with MARKUS MOORE JR on 1/18/2024    5:03 AM.            Final      No evidence of acute abnormality in the abdomen or pelvis.      Cholelithiasis.            Workstation performed: [id number]                    Procedures  ECG 12 Lead Documentation Only    Date/Time: 1/18/2024 3:02 AM    Performed by: Markus Moore Jr., DO  Authorized by: Markus Moore Jr., DO    Patient location:  ED  Previous ECG:     Previous ECG:  Unavailable  Interpretation:     Interpretation: non-specific    Rate:     ECG rate:  67    ECG rate assessment: normal    Rhythm:     Rhythm: sinus rhythm and A-V block      Rhythm comment:  1st degree  Ectopy:     Ectopy: none    QRS:     QRS intervals:  Normal  Conduction:     Conduction: normal    ST segments:     ST segments:  Normal  T waves:     T waves: normal    Comments:      qTc is 496  Does not appear long on evaluation           ED Course  ED Course as of 01/18/24 0548   Thu Jan 18, 2024   0322 Lipase(!)  Negative    0323 CBC and differential(!)  Normal    0323 Comprehensive metabolic panel(!)  Mild hyperglycemia    0500 Urine Macroscopic, POC(!)  Does not appear to be consistent with infection   0540 Urine Microscopic(!)  Blood  cells but no bacteria.  Mucus and yeast present.  White blood cells would be secondary to this and not an acute infection.                               SBIRT 22yo+      Flowsheet Row Most Recent Value   Initial Alcohol Screen: US AUDIT-C     1. How often do you have a drink containing alcohol? 0 Filed at: 01/18/2024 0222   2. How many drinks containing alcohol do you have on a typical day you are drinking?  0 Filed at: 01/18/2024 0222   3b. FEMALE Any Age, or MALE 65+: How often do you have 4 or more drinks on one occassion? 0 Filed at: 01/18/2024 0222   Audit-C Score 0 Filed at: 01/18/2024 0222   BEAU: How many times in the past year have you...    Used an illegal drug or used a prescription medication for non-medical reasons? Never Filed at: 01/18/2024 0222                      Medical Decision Making  Patient has a history of kidney stones but this was about 30 years ago.  States pain is similar and woke her from sleep tonight.  Pain is not reproducible on exam.  Abdomen is overall soft without pulsatile masses.  Will obtain labs, CT scan with IV contrast given her age for possible other causes such as AAA, dissection, pyelonephritis, etc.  Will control pain with IV morphine and give IV Zofran for nausea control.  Will hydrate with IV fluids, check urine for possible underlying infection and will also obtain an EKG since EMS reported a prolonged QT on their prehospital screening.  I did not get to see this.    4:59 AM  Patient feeling much better.  Labs are normal.  Urine is pending.  CT read by the radiologist is overall negative.  I do question a proximal ureter stone on series 2, 77/187.  [place] text sent to the radiologist to discuss.    5:03 AM  Urine is negative for infection.  Spoke with the radiologist.  She is going to make an addendum.  There is a 3 mm left UPJ stone with mild hydro.      5:47 AM  Patient's pain is still controlled at this time.  No evidence of infection on her urine.  Patient asking to  go home at this time.  I do feel that this is reasonable.  We did talk about pain control at home.  She is going to do Tylenol and ibuprofen.  I offered something stronger but she wants to use this for now.  She will return if the pain does not respond to those medications or if she is developing any signs or symptoms of infection.  Patient understands and agrees with this plan of care.  Questions and concerns answered.    Amount and/or Complexity of Data Reviewed  Labs: ordered. Decision-making details documented in ED Course.  Radiology: ordered.    Risk  Prescription drug management.             Disposition  Final diagnoses:   Ureterolithiasis   Left flank pain   Calculus of gallbladder without cholecystitis without obstruction   Hepatic cyst   Renal cyst   Scoliosis of thoracolumbar spine     Time reflects when diagnosis was documented in both MDM as applicable and the Disposition within this note       Time User Action Codes Description Comment    1/18/2024  5:04 AM Smeriglio, Markus Add [N20.1] Ureterolithiasis     1/18/2024  5:04 AM Smeriglio Markus Add [R10.9] Left flank pain     1/18/2024  5:04 AM SmeriglioKeoMarkus Add [K80.20] Calculus of gallbladder without cholecystitis without obstruction     1/18/2024  5:05 AM Smeriglio, Markus Add [K76.89] Hepatic cyst     1/18/2024  5:05 AM Smeriglio Markus Add [N28.1] Renal cyst     1/18/2024  5:05 AM Smeriglio Markus Add [M41.9] Scoliosis of thoracolumbar spine           ED Disposition       ED Disposition   Discharge    Condition   Stable    Date/Time   Thu Jan 18, 2024 0546    Comment   Colt Palafox discharge to home/self care.                   Follow-up Information       Follow up With Specialties Details Why Contact Info Additional Information    Kvng Berry, DO Family Medicine Call  If symptoms persist [address]   [address]  [facility] [address] [phone]       [facility] Emergency Department Emergency Medicine Go  to  If symptoms worsen [address] [address]  [phone] [facility] Emergency Department, [address], [place], [address]            Patient's Medications   Discharge Prescriptions    No medications on file       No discharge procedures on file.    PDMP Review       None            ED Provider  Electronically Signed by             Markus Zhu Jr.,   01/18/24 0548

## 2024-01-19 LAB — BACTERIA UR CULT: NORMAL

## 2024-01-22 ENCOUNTER — HOSPITAL ENCOUNTER (EMERGENCY)
Facility: HOSPITAL | Age: 69
Discharge: HOME | End: 2024-01-22
Attending: EMERGENCY MEDICINE
Payer: MEDICARE

## 2024-01-22 VITALS
TEMPERATURE: 97.9 F | HEART RATE: 75 BPM | BODY MASS INDEX: 20.07 KG/M2 | WEIGHT: 124.34 LBS | DIASTOLIC BLOOD PRESSURE: 79 MMHG | RESPIRATION RATE: 16 BRPM | SYSTOLIC BLOOD PRESSURE: 179 MMHG | OXYGEN SATURATION: 96 %

## 2024-01-22 DIAGNOSIS — R31.9 HEMATURIA: Primary | ICD-10-CM

## 2024-01-22 LAB
BACTERIA UR QL AUTO: ABNORMAL /HPF
BILIRUB UR QL STRIP: NEGATIVE
CLARITY UR: ABNORMAL
COLOR UR: ABNORMAL
GLUCOSE UR STRIP-MCNC: NEGATIVE MG/DL
HGB UR QL STRIP.AUTO: ABNORMAL
KETONES UR STRIP-MCNC: NEGATIVE MG/DL
LEUKOCYTE ESTERASE UR QL STRIP: NEGATIVE
MUCOUS THREADS UR QL AUTO: ABNORMAL
NITRITE UR QL STRIP: NEGATIVE
NON-SQ EPI CELLS URNS QL MICRO: ABNORMAL /HPF
PH UR STRIP.AUTO: 6.5 [PH] (ref 4.5–8)
PROT UR STRIP-MCNC: ABNORMAL MG/DL
RBC #/AREA URNS AUTO: ABNORMAL /HPF
SP GR UR STRIP.AUTO: 1.02 (ref 1–1.03)
UROBILINOGEN UR QL STRIP.AUTO: 4 E.U./DL
WBC #/AREA URNS AUTO: ABNORMAL /HPF

## 2024-01-22 PROCEDURE — 99283 EMERGENCY DEPT VISIT LOW MDM: CPT

## 2024-01-22 PROCEDURE — 99284 EMERGENCY DEPT VISIT MOD MDM: CPT | Performed by: EMERGENCY MEDICINE

## 2024-01-22 PROCEDURE — 81001 URINALYSIS AUTO W/SCOPE: CPT

## 2024-01-23 NOTE — ED ATTENDING ATTESTATION
1/22/2024  ILuh DO, saw and evaluated the patient. I have discussed the patient with the resident/non-physician practitioner and agree with the resident's/non-physician practitioner's findings, Plan of Care, and MDM as documented in the resident's/non-physician practitioner's note, except where noted. All available labs and Radiology studies were reviewed.  I was present for key portions of any procedure(s) performed by the resident/non-physician practitioner and I was immediately available to provide assistance.       At this point I agree with the current assessment done in the Emergency Department.  I have conducted an independent evaluation of this patient a history and physical is as follows:    Patient is a 68-year-old female here with family coming in today for hematuria.  Patient was seen several days ago where she noticed blood in urine was diagnosed with a kidney stone.  She states that resolved but today she had some discomfort and pointed to left suprapubic region.  She states that several days ago he was to the left flank.  She had 1 episode of discomfort with urination however that resolved.  She has no fevers, chills, nausea vomiting or diarrhea.  She has no strainer.    Patient was seen on January 18 and had a CT scan that did show a 3 mm calculus at the left UPJ causing mild hydronephrosis.    On my examination of patient:   BP (!) 179/79 (BP Location: Right arm)   Pulse 75   Temp 97.9 °F (36.6 °C) (Oral)   Resp 16   Wt 56.4 kg (124 lb 5.4 oz)   SpO2 96%   BMI 20.07 kg/m²   The patient is awake, alert and oriented.    Well-nourished, well-developed. Appears stated age.   Speaking normally in full sentences.  No conversational dyspnea  Head: Normocephalic, atraumatic, nontender.    External examination of the ears is unremarkable  Pupils are equal round and reactive to light, there is no conjunctival injection or scleral icterus noted  Nares are patent without rhinorrhea.  The  "oropharynx is moist without injection.  No malocclusion. No stridor. Normal phonation. No drooling. Normal swallowing.  No brawniness under the tongue  Neck: Symmetric, trachea midline. No JVD.  Supple  Lungs: Unlabored,  No retractions, CTAB, lungs sounds equal bilateral. No tachypnea.   Heart: Regular without murmurs rubs or gallops  Abdomen: Soft and nontender. There is no rebound or guarding.  No CVA tenderness bilaterally.  Musculoskeletal: Normal range of motion with grossly normal strength  Neuro: Cranial nerves II through XII grossly intact. Nonfocal exam  Skin: No rash noted, well perfused to the exposed areas  Psych: Mood and affect are appropriate    Patient's urine without any evidence of infection.  There is noted blood.  Patient was given strainer.    Long discussion with patient regarding her symptoms with progression of stone movement that can cause hematuria.  Patient with left flank pain now pain that is moving down.  Patient afebrile and well-appearing return to ER instructions given.  Will give strainer.  Patient agreeable      Diagnosis:    Hematuria  Urolithiasis    Disclosure: Voice to text software was used in the preparation of this document and could have resulted in translational errors.      Occasional wrong word or \"sound a like\" substitutions may have occurred due to the inherent limitations of voice recognition software.  Read the chart carefully and recognize, using context, where substitutions have occurred.       I have independently reviewed external records are available to me to the level of detail possible within the time constraints of my patient care responsibilities in the ED.        "

## 2024-01-23 NOTE — ED PROVIDER NOTES
"History  Chief Complaint   Patient presents with    Blood in Urine     Pt states she had a kidney stone last week and started to pass blood in her urine today.  Occasional pain in left groin     Patient is a 68-year-old female with history of hypothyroidism and hyperlipidemia who presents for hematuria.  Patient states that she was seen in this emergency department on 1/18 for left flank pain.  At that time she was diagnosed with a left 3 mm renal stone at the [facility].  She was discharged with Tylenol and Motrin.  She states that on that day she had 1 episode of hematuria but has had none until today.  She says she had 1 episode of hematuria this afternoon and became concerned.  She thinks that she could have a UTI.  She denies any dysuria, frequency, urgency.  She denies any fevers, chills, nausea, vomiting, abdominal pain.  She says she has a \"funny feeling\" in her left groin but not really pain.  She has not required any pain medicine since discharge in the hospital on 1/18.  She does not know if she is passed the stone.        Prior to Admission Medications   Prescriptions Last Dose Informant Patient Reported? Taking?   atorvastatin (LIPITOR) 10 mg tablet   No No   Sig: TAKE 1 TABLET BY MOUTH EVERY DAY   cholecalciferol (VITAMIN D3) 400 units tablet  Self Yes No   Sig: Take 400 Units by mouth daily   cyanocobalamin (VITAMIN B-12) 100 mcg tablet  Self Yes No   Sig: Take by mouth daily   levothyroxine 75 mcg tablet   No No   Sig: TAKE 1 TABLET (75 MCG TOTAL) BY MOUTH DAILY IN THE EARLY MORNING   methocarbamol (ROBAXIN) 500 mg tablet   No No   Sig: Take 1 tablet (500 mg total) by mouth 3 (three) times a day      Facility-Administered Medications: None       Past Medical History:   Diagnosis Date    Hyperlipidemia     Hypothyroid        Past Surgical History:   Procedure Laterality Date    SPINE SURGERY      scoliosis repair with kelvin in spine - age 18       Family History   Problem Relation Age of Onset    Thyroid " disease Mother     Coronary artery disease Mother     Heart disease Father     Stroke Father      I have reviewed and agree with the history as documented.    E-Cigarette/Vaping    E-Cigarette Use Never User      E-Cigarette/Vaping Substances    Nicotine No     THC No     CBD No     Flavoring No     Other No     Unknown No      Social History     Tobacco Use    Smoking status: Never    Smokeless tobacco: Never   Vaping Use    Vaping status: Never Used   Substance Use Topics    Alcohol use: Not Currently    Drug use: Never        Review of Systems   Constitutional:  Negative for chills and fever.   HENT:  Negative for congestion, rhinorrhea and sore throat.    Eyes:  Negative for pain and visual disturbance.   Respiratory:  Negative for cough and shortness of breath.    Cardiovascular:  Negative for chest pain and palpitations.   Gastrointestinal:  Negative for abdominal pain, constipation, diarrhea, nausea and vomiting.   Genitourinary:  Positive for hematuria. Negative for dysuria.   Musculoskeletal:  Negative for back pain and neck pain.   Skin:  Negative for color change and rash.   Neurological:  Negative for weakness and numbness.   All other systems reviewed and are negative.      Physical Exam  ED Triage Vitals [01/22/24 2010]   Temperature Pulse Respirations Blood Pressure SpO2   97.9 °F (36.6 °C) 75 16 (!) 179/79 96 %      Temp Source Heart Rate Source Patient Position - Orthostatic VS BP Location FiO2 (%)   Oral Monitor Sitting Right arm --      Pain Score       2             Orthostatic Vital Signs  Vitals:    01/22/24 2010   BP: (!) 179/79   Pulse: 75   Patient Position - Orthostatic VS: Sitting       Physical Exam  Vitals and nursing note reviewed.   Constitutional:       General: She is not in acute distress.     Appearance: Normal appearance. She is well-developed and normal weight. She is not ill-appearing, toxic-appearing or diaphoretic.   HENT:      Head: Normocephalic and atraumatic.      Right  Ear: External ear normal.      Left Ear: External ear normal.      Nose: Nose normal. No congestion or rhinorrhea.      Mouth/Throat:      Mouth: Mucous membranes are moist.      Pharynx: Oropharynx is clear. No oropharyngeal exudate or posterior oropharyngeal erythema.   Eyes:      General: No scleral icterus.        Right eye: No discharge.         Left eye: No discharge.      Extraocular Movements: Extraocular movements intact.      Conjunctiva/sclera: Conjunctivae normal.      Pupils: Pupils are equal, round, and reactive to light.   Cardiovascular:      Rate and Rhythm: Normal rate and regular rhythm.      Pulses: Normal pulses.      Heart sounds: Normal heart sounds. No murmur heard.     No friction rub. No gallop.   Pulmonary:      Effort: Pulmonary effort is normal. No respiratory distress.      Breath sounds: Normal breath sounds. No stridor. No wheezing, rhonchi or rales.   Abdominal:      General: Abdomen is flat. Bowel sounds are normal. There is no distension.      Palpations: Abdomen is soft.      Tenderness: There is no abdominal tenderness. There is no right CVA tenderness, left CVA tenderness, guarding or rebound.   Musculoskeletal:         General: No tenderness.      Cervical back: Neck supple.      Right lower leg: No edema.      Left lower leg: No edema.   Skin:     General: Skin is warm and dry.      Capillary Refill: Capillary refill takes less than 2 seconds.      Coloration: Skin is not jaundiced or pale.   Neurological:      General: No focal deficit present.      Mental Status: She is alert and oriented to person, place, and time.   Psychiatric:         Mood and Affect: Mood normal.         Behavior: Behavior normal.         ED Medications  Medications - No data to display    Diagnostic Studies  Results Reviewed       Procedure Component Value Units Date/Time    Urine Microscopic [[id number]]  (Abnormal) Collected: 01/22/24 2033    Lab Status: Final result Specimen: Urine, Clean Catch  Updated: 01/22/24 2058     RBC, UA Innumerable /hpf      WBC, UA 4-10 /hpf      Epithelial Cells Occasional /hpf      Bacteria, UA None Seen /hpf      MUCUS THREADS Occasional    Urine Macroscopic, POC [[id number]]  (Abnormal) Collected: 01/22/24 2033    Lab Status: Final result Specimen: Urine Updated: 01/22/24 2034     Color, UA Red     Clarity, UA Cloudy     pH, UA 6.5     Leukocytes, UA Negative     Nitrite, UA Negative     Protein, UA 30 (1+) mg/dl      Glucose, UA Negative mg/dl      Ketones, UA Negative mg/dl      Urobilinogen, UA 4.0 E.U./dl      Bilirubin, UA Negative     Occult Blood, UA Large     Specific Gravity, UA 1.025    Narrative:      CLINITEK RESULT                   No orders to display         Procedures  Procedures      ED Course                             SBIRT 20yo+      Flowsheet Row Most Recent Value   Initial Alcohol Screen: US AUDIT-C     1. How often do you have a drink containing alcohol? 0 Filed at: 01/22/2024 2012   2. How many drinks containing alcohol do you have on a typical day you are drinking?  0 Filed at: 01/22/2024 2012   3a. Male UNDER 65: How often do you have five or more drinks on one occasion? 0 Filed at: 01/22/2024 2012   3b. FEMALE Any Age, or MALE 65+: How often do you have 4 or more drinks on one occassion? 0 Filed at: 01/22/2024 2012   Audit-C Score 0 Filed at: 01/22/2024 2012   BEAU: How many times in the past year have you...    Used an illegal drug or used a prescription medication for non-medical reasons? Never Filed at: 01/22/2024 2012                  Medical Decision Making  Patient is a 68-year-old female with history of hypothyroidism and hyperlipidemia who presents with hematuria.    DDx includes not limited to nephrolithiasis, UTI.  Will obtain urine dip to rule out UTI.  Patient complains of no pain at this time.    Patient's urine dip shows blood but no signs of infection.  Likely retained stone or inflammation following stone passage.  Provided patient  with urine strainer and advised her to strain her urine while her symptoms persist.  Return precautions given, all questions answered.          Disposition  Final diagnoses:   Hematuria     Time reflects when diagnosis was documented in both MDM as applicable and the Disposition within this note       Time User Action Codes Description Comment    1/22/2024  8:40 PM MarciaBigg Add [R31.9] Hematuria           ED Disposition       ED Disposition   Discharge    Condition   Stable    Date/Time   Mon Jan 22, 2024 2040    Comment   Colt Palafox discharge to home/self care.                   Follow-up Information       Follow up With Specialties Details Why Contact Info Additional Information    Kvng Berry, DO Family Medicine Call  As needed [address]  [address]  [facility] [id number] [phone]       [facility] Emergency Department Emergency Medicine Go to  If symptoms worsen or if you have any other specific concerns [address] [facility] [address]  [phone] [facility] Emergency Department, [address] [place], [place], [address]            Discharge Medication List as of 1/22/2024  8:41 PM        CONTINUE these medications which have NOT CHANGED    Details   atorvastatin (LIPITOR) 10 mg tablet TAKE 1 TABLET BY MOUTH EVERY DAY, Starting Fri 9/8/2023, Normal      cholecalciferol (VITAMIN D3) 400 units tablet Take 400 Units by mouth daily, Historical Med      cyanocobalamin (VITAMIN B-12) 100 mcg tablet Take by mouth daily, Historical Med      levothyroxine 75 mcg tablet TAKE 1 TABLET (75 MCG TOTAL) BY MOUTH DAILY IN THE EARLY MORNING, Starting Thu 9/7/2023, Normal      methocarbamol (ROBAXIN) 500 mg tablet Take 1 tablet (500 mg total) by mouth 3 (three) times a day, Starting Wed 9/13/2023, Normal           No discharge procedures on file.    PDMP Review       None             ED Provider  Attending physically available and evaluated Colt  Javy. I managed the patient along with the ED Attending.    Electronically Signed by           Bigg Kennedy MD  01/22/24 3033

## 2024-01-23 NOTE — DISCHARGE INSTRUCTIONS
Please strain your urine.  Please follow-up with your family doctor as needed.  Please take Tylenol and Motrin for any pain.  Please return to the emergency department for any new or concerning symptoms.

## 2024-01-24 ENCOUNTER — APPOINTMENT (EMERGENCY)
Dept: CT IMAGING | Facility: HOSPITAL | Age: 69
End: 2024-01-24
Payer: MEDICARE

## 2024-01-24 ENCOUNTER — HOSPITAL ENCOUNTER (EMERGENCY)
Facility: HOSPITAL | Age: 69
Discharge: HOME | End: 2024-01-24
Attending: EMERGENCY MEDICINE
Payer: MEDICARE

## 2024-01-24 VITALS
SYSTOLIC BLOOD PRESSURE: 139 MMHG | RESPIRATION RATE: 18 BRPM | DIASTOLIC BLOOD PRESSURE: 65 MMHG | HEART RATE: 74 BPM | OXYGEN SATURATION: 95 % | TEMPERATURE: 97.9 F

## 2024-01-24 DIAGNOSIS — N20.9 UROLITHIASIS: Primary | ICD-10-CM

## 2024-01-24 DIAGNOSIS — N39.0 UTI (URINARY TRACT INFECTION): ICD-10-CM

## 2024-01-24 PROBLEM — N20.0 NEPHROLITHIASIS: Status: ACTIVE | Noted: 2024-01-24

## 2024-01-24 LAB
ALBUMIN SERPL BCP-MCNC: 4 G/DL (ref 3.5–5)
ALP SERPL-CCNC: 74 U/L (ref 34–104)
ALT SERPL W P-5'-P-CCNC: 9 U/L (ref 7–52)
ANION GAP SERPL CALCULATED.3IONS-SCNC: 8 MMOL/L
AST SERPL W P-5'-P-CCNC: 14 U/L (ref 13–39)
BACTERIA UR QL AUTO: ABNORMAL /HPF
BASOPHILS # BLD AUTO: 0.03 THOUSANDS/ÂΜL (ref 0–0.1)
BASOPHILS NFR BLD AUTO: 1 % (ref 0–1)
BILIRUB SERPL-MCNC: 0.65 MG/DL (ref 0.2–1)
BILIRUB UR QL STRIP: NEGATIVE
BUN SERPL-MCNC: 15 MG/DL (ref 5–25)
CALCIUM SERPL-MCNC: 8.9 MG/DL (ref 8.4–10.2)
CHLORIDE SERPL-SCNC: 104 MMOL/L (ref 96–108)
CLARITY UR: ABNORMAL
CO2 SERPL-SCNC: 27 MMOL/L (ref 21–32)
COLOR UR: ABNORMAL
CREAT SERPL-MCNC: 0.62 MG/DL (ref 0.6–1.3)
EOSINOPHIL # BLD AUTO: 0.26 THOUSAND/ÂΜL (ref 0–0.61)
EOSINOPHIL NFR BLD AUTO: 4 % (ref 0–6)
ERYTHROCYTE [DISTWIDTH] IN BLOOD BY AUTOMATED COUNT: 12 % (ref 11.6–15.1)
GFR SERPL CREATININE-BSD FRML MDRD: 94 ML/MIN/1.73SQ M
GLUCOSE SERPL-MCNC: 131 MG/DL (ref 65–140)
GLUCOSE UR STRIP-MCNC: NEGATIVE MG/DL
HCT VFR BLD AUTO: 42.6 % (ref 34.8–46.1)
HGB BLD-MCNC: 14 G/DL (ref 11.5–15.4)
HGB UR QL STRIP.AUTO: ABNORMAL
IMM GRANULOCYTES # BLD AUTO: 0.02 THOUSAND/UL (ref 0–0.2)
IMM GRANULOCYTES NFR BLD AUTO: 0 % (ref 0–2)
KETONES UR STRIP-MCNC: NEGATIVE MG/DL
LEUKOCYTE ESTERASE UR QL STRIP: ABNORMAL
LYMPHOCYTES # BLD AUTO: 1.15 THOUSANDS/ÂΜL (ref 0.6–4.47)
LYMPHOCYTES NFR BLD AUTO: 19 % (ref 14–44)
MCH RBC QN AUTO: 30.1 PG (ref 26.8–34.3)
MCHC RBC AUTO-ENTMCNC: 32.9 G/DL (ref 31.4–37.4)
MCV RBC AUTO: 92 FL (ref 82–98)
MONOCYTES # BLD AUTO: 0.76 THOUSAND/ÂΜL (ref 0.17–1.22)
MONOCYTES NFR BLD AUTO: 12 % (ref 4–12)
MUCOUS THREADS UR QL AUTO: ABNORMAL
NEUTROPHILS # BLD AUTO: 3.95 THOUSANDS/ÂΜL (ref 1.85–7.62)
NEUTS SEG NFR BLD AUTO: 64 % (ref 43–75)
NITRITE UR QL STRIP: NEGATIVE
NON-SQ EPI CELLS URNS QL MICRO: ABNORMAL /HPF
NRBC BLD AUTO-RTO: 0 /100 WBCS
PH UR STRIP.AUTO: 6 [PH]
PLATELET # BLD AUTO: 263 THOUSANDS/UL (ref 149–390)
PMV BLD AUTO: 9.2 FL (ref 8.9–12.7)
POTASSIUM SERPL-SCNC: 3.1 MMOL/L (ref 3.5–5.3)
PROT SERPL-MCNC: 7 G/DL (ref 6.4–8.4)
PROT UR STRIP-MCNC: ABNORMAL MG/DL
RBC # BLD AUTO: 4.65 MILLION/UL (ref 3.81–5.12)
RBC #/AREA URNS AUTO: ABNORMAL /HPF
SODIUM SERPL-SCNC: 139 MMOL/L (ref 135–147)
SP GR UR STRIP.AUTO: 1.02 (ref 1–1.03)
UROBILINOGEN UR STRIP-ACNC: <2 MG/DL
WBC # BLD AUTO: 6.17 THOUSAND/UL (ref 4.31–10.16)
WBC #/AREA URNS AUTO: ABNORMAL /HPF

## 2024-01-24 PROCEDURE — 81001 URINALYSIS AUTO W/SCOPE: CPT

## 2024-01-24 PROCEDURE — 99284 EMERGENCY DEPT VISIT MOD MDM: CPT

## 2024-01-24 PROCEDURE — 96375 TX/PRO/DX INJ NEW DRUG ADDON: CPT

## 2024-01-24 PROCEDURE — 96365 THER/PROPH/DIAG IV INF INIT: CPT

## 2024-01-24 PROCEDURE — 74176 CT ABD & PELVIS W/O CONTRAST: CPT

## 2024-01-24 PROCEDURE — 99285 EMERGENCY DEPT VISIT HI MDM: CPT | Performed by: EMERGENCY MEDICINE

## 2024-01-24 PROCEDURE — G1004 CDSM NDSC: HCPCS

## 2024-01-24 PROCEDURE — 80053 COMPREHEN METABOLIC PANEL: CPT

## 2024-01-24 PROCEDURE — 99284 EMERGENCY DEPT VISIT MOD MDM: CPT | Performed by: UROLOGY

## 2024-01-24 PROCEDURE — 36415 COLL VENOUS BLD VENIPUNCTURE: CPT

## 2024-01-24 PROCEDURE — 85025 COMPLETE CBC W/AUTO DIFF WBC: CPT

## 2024-01-24 PROCEDURE — 87086 URINE CULTURE/COLONY COUNT: CPT

## 2024-01-24 PROCEDURE — 96361 HYDRATE IV INFUSION ADD-ON: CPT

## 2024-01-24 RX ORDER — CEFDINIR 300 MG/1
300 CAPSULE ORAL EVERY 12 HOURS SCHEDULED
Qty: 28 CAPSULE | Refills: 0 | Status: SHIPPED | OUTPATIENT
Start: 2024-01-24 | End: 2024-01-26

## 2024-01-24 RX ORDER — CEFTRIAXONE 1 G/50ML
1000 INJECTION, SOLUTION INTRAVENOUS ONCE
Status: COMPLETED | OUTPATIENT
Start: 2024-01-24 | End: 2024-01-24

## 2024-01-24 RX ORDER — TAMSULOSIN HYDROCHLORIDE 0.4 MG/1
0.4 CAPSULE ORAL
Qty: 14 CAPSULE | Refills: 0 | Status: SHIPPED | OUTPATIENT
Start: 2024-01-24 | End: 2024-01-24

## 2024-01-24 RX ORDER — ONDANSETRON 4 MG/1
4 TABLET, ORALLY DISINTEGRATING ORAL EVERY 6 HOURS PRN
Qty: 20 TABLET | Refills: 0 | Status: SHIPPED | OUTPATIENT
Start: 2024-01-24

## 2024-01-24 RX ORDER — TAMSULOSIN HYDROCHLORIDE 0.4 MG/1
0.4 CAPSULE ORAL
Qty: 7 CAPSULE | Refills: 0 | Status: SHIPPED | OUTPATIENT
Start: 2024-01-24 | End: 2024-01-31

## 2024-01-24 RX ORDER — OXYCODONE HYDROCHLORIDE 5 MG/1
5 TABLET ORAL EVERY 4 HOURS PRN
Qty: 10 TABLET | Refills: 0 | Status: SHIPPED | OUTPATIENT
Start: 2024-01-24 | End: 2024-01-27

## 2024-01-24 RX ORDER — IBUPROFEN 600 MG/1
600 TABLET ORAL EVERY 6 HOURS PRN
Qty: 30 TABLET | Refills: 0 | Status: SHIPPED | OUTPATIENT
Start: 2024-01-24

## 2024-01-24 RX ORDER — POTASSIUM CHLORIDE 20 MEQ/1
40 TABLET, EXTENDED RELEASE ORAL ONCE
Status: COMPLETED | OUTPATIENT
Start: 2024-01-24 | End: 2024-01-24

## 2024-01-24 RX ADMIN — MORPHINE SULFATE 2 MG: 2 INJECTION, SOLUTION INTRAMUSCULAR; INTRAVENOUS at 06:23

## 2024-01-24 RX ADMIN — CEFTRIAXONE 1000 MG: 1 INJECTION, SOLUTION INTRAVENOUS at 08:16

## 2024-01-24 RX ADMIN — POTASSIUM CHLORIDE 40 MEQ: 1500 TABLET, EXTENDED RELEASE ORAL at 07:55

## 2024-01-24 RX ADMIN — SODIUM CHLORIDE 1000 ML: 0.9 INJECTION, SOLUTION INTRAVENOUS at 06:22

## 2024-01-24 NOTE — ED PROVIDER NOTES
History  Chief Complaint   Patient presents with    Abdominal Pain     Pt with recent kidney stone.       68F past medical history of hyperlipidemia, hypothyroidism recently diagnosed with kidney stone 1/18 3mm UPJ presenting with flank pain that was previously controlled with Tylenol at home, no longer being controlled with Tylenol. Patient is not having any other symptoms, review of systems is otherwise negative.        Prior to Admission Medications   Prescriptions Last Dose Informant Patient Reported? Taking?   atorvastatin (LIPITOR) 10 mg tablet   No Yes   Sig: TAKE 1 TABLET BY MOUTH EVERY DAY   cholecalciferol (VITAMIN D3) 400 units tablet  Self Yes Yes   Sig: Take 400 Units by mouth daily   cyanocobalamin (VITAMIN B-12) 100 mcg tablet  Self Yes Yes   Sig: Take by mouth daily   levothyroxine 75 mcg tablet   No Yes   Sig: TAKE 1 TABLET (75 MCG TOTAL) BY MOUTH DAILY IN THE EARLY MORNING   methocarbamol (ROBAXIN) 500 mg tablet   No No   Sig: Take 1 tablet (500 mg total) by mouth 3 (three) times a day      Facility-Administered Medications: None       Past Medical History:   Diagnosis Date    Hyperlipidemia     Hypothyroid        Past Surgical History:   Procedure Laterality Date    SPINE SURGERY      scoliosis repair with kelvin in spine - age 18       Family History   Problem Relation Age of Onset    Thyroid disease Mother     Coronary artery disease Mother     Heart disease Father     Stroke Father      I have reviewed and agree with the history as documented.    E-Cigarette/Vaping    E-Cigarette Use Never User      E-Cigarette/Vaping Substances    Nicotine No     THC No     CBD No     Flavoring No     Other No     Unknown No      Social History     Tobacco Use    Smoking status: Never    Smokeless tobacco: Never   Vaping Use    Vaping status: Never Used   Substance Use Topics    Alcohol use: Not Currently    Drug use: Never        Review of Systems   All other systems reviewed and are negative.      Physical  Exam  ED Triage Vitals [01/24/24 0536]   Temperature Pulse Respirations Blood Pressure SpO2   97.9 °F (36.6 °C) 77 18 (!) 180/79 97 %      Temp Source Heart Rate Source Patient Position - Orthostatic VS BP Location FiO2 (%)   Oral Monitor Lying Right arm --      Pain Score       6             Orthostatic Vital Signs  Vitals:    01/24/24 0545 01/24/24 0620 01/24/24 0700 01/24/24 0951   BP: (!) 180/79 151/70 154/67 139/65   Pulse: 72 65 68 74   Patient Position - Orthostatic VS: Lying Lying  Sitting       Physical Exam  Vitals and nursing note reviewed.   Constitutional:       General: She is not in acute distress.     Appearance: She is well-developed.   HENT:      Head: Normocephalic and atraumatic.   Eyes:      Conjunctiva/sclera: Conjunctivae normal.   Cardiovascular:      Rate and Rhythm: Normal rate and regular rhythm.      Heart sounds: No murmur heard.  Pulmonary:      Effort: Pulmonary effort is normal. No respiratory distress.      Breath sounds: Normal breath sounds.   Abdominal:      Palpations: Abdomen is soft.      Tenderness: There is no abdominal tenderness. There is left CVA tenderness. There is no right CVA tenderness.   Musculoskeletal:         General: No swelling.      Cervical back: Neck supple.   Skin:     General: Skin is warm and dry.      Capillary Refill: Capillary refill takes less than 2 seconds.   Neurological:      Mental Status: She is alert.   Psychiatric:         Mood and Affect: Mood normal.         ED Medications  Medications   sodium chloride 0.9 % bolus 1,000 mL (0 mL Intravenous Stopped 1/24/24 0700)   morphine injection 2 mg (2 mg Intravenous Given 1/24/24 0623)   potassium chloride (K-DUR,KLOR-CON) CR tablet 40 mEq (40 mEq Oral Given 1/24/24 0755)   cefTRIAXone (ROCEPHIN) IVPB (premix in dextrose) 1,000 mg 50 mL (0 mg Intravenous Stopped 1/24/24 0843)       Diagnostic Studies  Results Reviewed       Procedure Component Value Units Date/Time    Urine Microscopic [[id number]]   (Abnormal) Collected: 01/24/24 0622    Lab Status: Final result Specimen: Urine, Clean Catch Updated: 01/24/24 0752     RBC, UA Innumerable /hpf      WBC, UA 30-50 /hpf      Epithelial Cells None Seen /hpf      Bacteria, UA Occasional /hpf      MUCUS THREADS Innumerable    Urine culture [[id number]] Collected: 01/24/24 0622    Lab Status: In process Specimen: Urine, Clean Catch Updated: 01/24/24 0752    UA w Reflex to Microscopic w Reflex to Culture [[id number]]  (Abnormal) Collected: 01/24/24 0622    Lab Status: Final result Specimen: Urine, Clean Catch Updated: 01/24/24 0751     Color, UA Light Orange     Clarity, UA Turbid     Specific Gravity, UA 1.022     pH, UA 6.0     Leukocytes, UA Moderate     Nitrite, UA Negative     Protein, UA 50 (1+) mg/dl      Glucose, UA Negative mg/dl      Ketones, UA Negative mg/dl      Urobilinogen, UA <2.0 mg/dl      Bilirubin, UA Negative     Occult Blood, UA Large    Comprehensive metabolic panel [[id number]]  (Abnormal) Collected: 01/24/24 0622    Lab Status: Final result Specimen: Blood from Arm, Left Updated: 01/24/24 0655     Sodium 139 mmol/L      Potassium 3.1 mmol/L      Chloride 104 mmol/L      CO2 27 mmol/L      ANION GAP 8 mmol/L      BUN 15 mg/dL      Creatinine 0.62 mg/dL      Glucose 131 mg/dL      Calcium 8.9 mg/dL      AST 14 U/L      ALT 9 U/L      Alkaline Phosphatase 74 U/L      Total Protein 7.0 g/dL      Albumin 4.0 g/dL      Total Bilirubin 0.65 mg/dL      eGFR 94 ml/min/1.73sq m     Narrative:      National Kidney Disease Foundation guidelines for Chronic Kidney Disease (CKD):     Stage 1 with normal or high GFR (GFR > 90 mL/min/1.73 square meters)    Stage 2 Mild CKD (GFR = 60-89 mL/min/1.73 square meters)    Stage 3A Moderate CKD (GFR = 45-59 mL/min/1.73 square meters)    Stage 3B Moderate CKD (GFR = 30-44 mL/min/1.73 square meters)    Stage 4 Severe CKD (GFR = 15-29 mL/min/1.73 square meters)    Stage 5 End Stage CKD (GFR <15 mL/min/1.73 square  meters)  Note: GFR calculation is accurate only with a steady state creatinine    CBC and differential [[id number]] Collected: 01/24/24 0622    Lab Status: Final result Specimen: Blood from Arm, Left Updated: 01/24/24 0640     WBC 6.17 Thousand/uL      RBC 4.65 Million/uL      Hemoglobin 14.0 g/dL      Hematocrit 42.6 %      MCV 92 fL      MCH 30.1 pg      MCHC 32.9 g/dL      RDW 12.0 %      MPV 9.2 fL      Platelets 263 Thousands/uL      nRBC 0 /100 WBCs      Neutrophils Relative 64 %      Immat GRANS % 0 %      Lymphocytes Relative 19 %      Monocytes Relative 12 %      Eosinophils Relative 4 %      Basophils Relative 1 %      Neutrophils Absolute 3.95 Thousands/µL      Immature Grans Absolute 0.02 Thousand/uL      Lymphocytes Absolute 1.15 Thousands/µL      Monocytes Absolute 0.76 Thousand/µL      Eosinophils Absolute 0.26 Thousand/µL      Basophils Absolute 0.03 Thousands/µL                    CT renal stone study abdomen pelvis wo contrast   Final Result by Delicia Llanes MD (01/24 0932)      New 3 mm distal left ureteral calculus without significant hydroureter or hydronephrosis.      Cholelithiasis. No pericholecystic inflammation.      The study was marked in EPIC for immediate notification.         Workstation performed: [id number]               Procedures  Procedures      ED Course                             SBIRT 22yo+      Flowsheet Row Most Recent Value   Initial Alcohol Screen: US AUDIT-C     1. How often do you have a drink containing alcohol? 0 Filed at: 01/24/2024 0537   2. How many drinks containing alcohol do you have on a typical day you are drinking?  0 Filed at: 01/24/2024 0537   3b. FEMALE Any Age, or MALE 65+: How often do you have 4 or more drinks on one occassion? 0 Filed at: 01/24/2024 0537   Audit-C Score 0 Filed at: 01/24/2024 0537   BEAU: How many times in the past year have you...    Used an illegal drug or used a prescription medication for non-medical reasons? Never Filed at:  01/24/2024 0537                  Medical Decision Making  Will evaluate patient with CBC, CMP, UA, symptomatic management  Patient's pain was controlled with morphine, labs is showing signs of a new urinary tract infection. There is no signs of KEY, elevated white count. Patient is not having systemic symptoms.  Given signs of urinary tract infection, will reach out to urology, start antibiotics, scan to rule out obstruction  CT showing new stone in part in the left ureter, no hydronephrosis or obstruction.  Urology evaluated patient and stated that they would follow-up outpatient in clinic.  Patient stable for discharge, not appearing septic, got dose of antibiotics in the emergency department.  Patient expressed understanding of the follow-up instructions.  Return precautions discussed including signs of worsening infection, all questions answered prior to discharge.    Amount and/or Complexity of Data Reviewed  Labs: ordered.  Radiology: ordered.    Risk  Prescription drug management.          Disposition  Final diagnoses:   Urolithiasis   UTI (urinary tract infection)     Time reflects when diagnosis was documented in both MDM as applicable and the Disposition within this note       Time User Action Codes Description Comment    1/24/2024  6:27 AM Ghassan Daley Add [N20.9] Urolithiasis     1/24/2024  8:27 AM Ghassan Daley Add [N39.0] UTI (urinary tract infection)           ED Disposition       ED Disposition   Discharge    Condition   Stable    Date/Time   Wed Jan 24, 2024 0637    Comment   Colt Palafox discharge to home/self care.                   Follow-up Information       Follow up With Specialties Details Why Contact Info Additional Information    Kvng Berry DO Family Medicine   [address]  [address]  [facility] [address] [phone]       [facility]   [address]  [address]  [facility] [address] [phone] [facility]  [place], [address] [facility] [facility],  [place], [place], [address]   [phone]            Discharge Medication List as of 1/24/2024  9:46 AM        START taking these medications    Details   cefdinir (OMNICEF) 300 mg capsule Take 1 capsule (300 mg total) by mouth every 12 (twelve) hours for 14 days, Starting Wed 1/24/2024, Until Wed 2/7/2024, Normal      ibuprofen (MOTRIN) 600 mg tablet Take 1 tablet (600 mg total) by mouth every 6 (six) hours as needed for moderate pain, Starting Wed 1/24/2024, Normal      ondansetron (ZOFRAN-ODT) 4 mg disintegrating tablet Take 1 tablet (4 mg total) by mouth every 6 (six) hours as needed for nausea, Starting Wed 1/24/2024, Normal      oxyCODONE (Roxicodone) 5 immediate release tablet Take 1 tablet (5 mg total) by mouth every 4 (four) hours as needed for severe pain for up to 3 days Max Daily Amount: 30 mg, Starting Wed 1/24/2024, Until Sat 1/27/2024 at 2359, Normal      tamsulosin (FLOMAX) 0.4 mg Take 1 capsule (0.4 mg total) by mouth daily with dinner for 7 days, Starting Wed 1/24/2024, Until Wed 1/31/2024, Normal           CONTINUE these medications which have NOT CHANGED    Details   atorvastatin (LIPITOR) 10 mg tablet TAKE 1 TABLET BY MOUTH EVERY DAY, Starting Fri 9/8/2023, Normal      cholecalciferol (VITAMIN D3) 400 units tablet Take 400 Units by mouth daily, Historical Med      cyanocobalamin (VITAMIN B-12) 100 mcg tablet Take by mouth daily, Historical Med      levothyroxine 75 mcg tablet TAKE 1 TABLET (75 MCG TOTAL) BY MOUTH DAILY IN THE EARLY MORNING, Starting Thu 9/7/2023, Normal      methocarbamol (ROBAXIN) 500 mg tablet Take 1 tablet (500 mg total) by mouth 3 (three) times a day, Starting Wed 9/13/2023, Normal           No discharge procedures on file.    PDMP Review       None             ED Provider  Attending physically available and evaluated Colt Palfaox. I managed the patient along with the ED Attending.    Electronically Signed by           Ghassan Daley,  MD  01/24/24 1459

## 2024-01-24 NOTE — ASSESSMENT & PLAN NOTE
Patient has not followed with urology in the past for this issue  Patient states she did have a kidney stone over 30 years ago but was able to pass it on her own  Patient states pain began on 1/18/2024, she came to the ER and CT showed 3 mm left-sided UPJ stone with mild left-sided hydronephrosis  Patient was stable and pain was controlled and she was discharged home  Patient return to the ED on 1/22/2024 with gross hematuria, most likely from her kidney stone  Patient was once again discharged home  Patient presents to ED this morning with ongoing left-sided flank pain rated a 7/10, and new onset nausea  Patient's vital signs remained stable, hypertensive on presentation, but this has returned to normal  Pain well-controlled on morphine  Creatinine stable at 0.62, WBC 6.17, Hgb 14  Urine analysis shows innumerable RBC, 30-50 WBC  Urine culture pending, patient does report mild frequency/dysuria  Patient denies fever/chills, vomiting, hematuria, or difficulty urinating at this time  Repeat CT completed today shows - 3 mm distal left ureteral calculus without significant hydroureter or hydronephrosis.   No surgical intervention is needed at this time, patient is cleared for discharge  Provide patient with Flomax, strainer for urine, and antiemetics  Cefdinir provided to patient from probable UTI  Once urine culture comes back - provide patient with tailored antibiotic regiment  Pain medication as needed  Telephone note placed for close outpatient follow-up

## 2024-01-24 NOTE — CONSULTS
Consult - Urology   Colt Palafox 1955, 68 y.o. female MRN: [id number]    Unit/Bed#: ED-05 Encounter: [id number]      Nephrolithiasis  Assessment & Plan  Patient has not followed with urology in the past for this issue  Patient states she did have a kidney stone over 30 years ago but was able to pass it on her own  Patient states pain began on 1/18/2024, she came to the ER and CT showed 3 mm left-sided UPJ stone with mild left-sided hydronephrosis  Patient was stable and pain was controlled and she was discharged home  Patient return to the ED on 1/22/2024 with gross hematuria, most likely from her kidney stone  Patient was once again discharged home  Patient presents to ED this morning with ongoing left-sided flank pain rated a 7/10, and new onset nausea  Patient's vital signs remained stable, hypertensive on presentation, but this has returned to normal  Pain well-controlled on morphine  Creatinine stable at 0.62, WBC 6.17, Hgb 14  Urine analysis shows innumerable RBC, 30-50 WBC  Urine culture pending, patient does report mild frequency/dysuria  Patient denies fever/chills, vomiting, hematuria, or difficulty urinating at this time  Repeat CT completed today shows - 3 mm distal left ureteral calculus without significant hydroureter or hydronephrosis.   No surgical intervention is needed at this time, patient is cleared for discharge  Provide patient with Flomax, strainer for urine, and antiemetics  Cefdinir provided to patient from probable UTI  Once urine culture comes back - provide patient with tailored antibiotic regiment  Pain medication as needed  Telephone note placed for close outpatient follow-up    Urology will sign off but remain available for any further inpatient needs. Please feel free to contact the provider currently covering the [facility] role for this [facility] with questions or concerns.    Subjective:   HPI is a 68-year-old female presented to [facility] ED early this morning with  left-sided abdominal/flank pain, and some nausea.  Patient was seen in the ED on 1/18 for the same complaint, CT scan showed 3 mm left UPJ stone with mild left hydronephrosis.  Patient's pain was controlled at this time and she was sent home.  She returned again to the ER 1/22 with gross hematuria most likely due to stone progression.  Patient states she had 1 episode of kidney stones over 30 years ago, but she was able to pass it on her own.    Today in the ED patient's vitals remained stable, she is afebrile. At presentation she was hypertensive but that has improved with fluids and pain management.  Patient is in no acute distress, and is sitting comfortably on the bed.  Creatinine remains stable at 0.62, WBC normal, Hgb normal.  Urine microscopy shows innumerable RBC, WBC 30-50.  Patient is having some dysuria and mild frequency.  Urine culture is currently pending.  Patient was taking Tylenol at home  but notes that it was no longer suppressing her pain which is why she decided to come to the ER.  Pain on presentation was rated a 7/10, but after receiving morphine she no longer has pain.  Patient states the pain was localized to her left flank and radiated into her left groin/abdomen.  She is no longer having any nausea.  She also denies vomiting, fever/chills, bowel changes, hematuria, or difficulty urinating.    Repeat CT obtained today shows: 3 mm distal left ureteral calculus without significant hydroureter or hydronephrosis.  No signs of obstruction at this time. Patient's vitals, kidney function, and pain are all stable and controlled.  Discharge is appropriate at this time-provide patient with pain medication, antiemetics, a strainer for urine, and Flomax.  Antibiotic course of cefdinir will be sent home with patient for probable UTI, antibiotics will be tailored to bacteria once urine culture returns.  Telephone note will be placed for close outpatient follow-up in the near future.  No surgical  intervention is needed at this time.  Patient is cleared to be discharged home.    Review of Systems   Constitutional:  Negative for activity change, chills and fever.   Respiratory:  Negative for apnea, cough and shortness of breath.    Cardiovascular:  Negative for chest pain and leg swelling.   Gastrointestinal:  Positive for abdominal pain (left sided) and nausea. Negative for constipation, diarrhea and vomiting.   Genitourinary:  Positive for dysuria, flank pain (left sided) and frequency. Negative for difficulty urinating, hematuria, pelvic pain, urgency, vaginal bleeding and vaginal discharge.   Musculoskeletal:  Negative for arthralgias and back pain.   Neurological:  Negative for dizziness and headaches.   Psychiatric/Behavioral: Negative.     All other systems reviewed and are negative.      Objective:  Vitals: Blood pressure 154/67, pulse 68, temperature 97.9 °F (36.6 °C), temperature source Oral, resp. rate 18, SpO2 97%.,There is no height or weight on file to calculate BMI.    Physical Exam  Constitutional:       General: She is not in acute distress.     Appearance: Normal appearance. She is not ill-appearing.   HENT:      Head: Normocephalic and atraumatic.      Right Ear: External ear normal.      Left Ear: External ear normal.      Nose: Nose normal.      Mouth/Throat:      Pharynx: Oropharynx is clear.   Eyes:      Extraocular Movements: Extraocular movements intact.      Conjunctiva/sclera: Conjunctivae normal.   Cardiovascular:      Rate and Rhythm: Normal rate.      Comments: Hypertensive at presentation, BP returned to normal   Pulmonary:      Effort: Pulmonary effort is normal.   Abdominal:      General: Abdomen is flat. There is no distension.      Tenderness: There is no abdominal tenderness. There is left CVA tenderness (mild). There is no right CVA tenderness.   Musculoskeletal:         General: Normal range of motion.      Cervical back: Normal range of motion.   Neurological:       General: No focal deficit present.      Mental Status: She is alert and oriented to person, place, and time.   Psychiatric:         Mood and Affect: Mood normal.         Behavior: Behavior normal.         Imaging:    CT ABDOMEN AND PELVIS WITHOUT IV CONTRAST - LOW DOSE RENAL STONE    INDICATION: Flank pain, kidney stone suspected  evaluate for hydronephrosis, urinary obstruction.    COMPARISON: CT abdomen/pelvis dated 1/18/2024.    TECHNIQUE: Low radiation dose thin section CT examination of the abdomen and pelvis was performed without intravenous or oral contrast according to a protocol specifically designed to evaluate for urinary tract calculus. Axial, sagittal, and coronal 2D  reformatted images were created from the source data and submitted for interpretation. Evaluation for pathology in the abdomen and pelvis that is unrelated to urinary tract calculi is limited.    Radiation dose length product (DLP) for this visit: 241 mGy-cm . This examination, like all CT scans performed in the [facility], was performed utilizing techniques to minimize radiation dose exposure, including the use of iterative  reconstruction and automated exposure control.    URINARY TRACT FINDINGS:    RIGHT KIDNEY AND URETER: No urinary tract calculi. No hydronephrosis or hydroureter. 1.2 cm right renal cyst with small peripheral calcification is redemonstrated.    LEFT KIDNEY AND URETER: There is a new 3 mm calculus in the distal ureter without significant hydroureter or hydronephrosis.    URINARY BLADDER: Unremarkable.    ADDITIONAL FINDINGS:    LOWER CHEST: No clinically significant abnormality in the visualized lower chest.    SOLID VISCERA: 3.4 cm posterior right hepatic lobe cyst is again noted. Limited low radiation dose noncontrast CT evaluation demonstrates no clinically significant abnormality of the imaged portions of the spleen, pancreas, or adrenal glands.    GALLBLADDER/BILIARY TREE: Cholelithiasis without  findings of acute cholecystitis. No biliary dilation.    STOMACH AND BOWEL: Unremarkable.    APPENDIX: No findings to suggest appendicitis.    ABDOMINOPELVIC CAVITY: No ascites. No pneumoperitoneum. No lymphadenopathy.    VESSELS: Atherosclerotic disease. No abdominal aortic aneurysm.    REPRODUCTIVE ORGANS: Unremarkable for patient's age.    ABDOMINAL WALL/INGUINAL REGIONS: Unremarkable.    BONES: No acute fracture or suspicious osseous lesion. Thoracolumbar dextroscoliosis. Postsurgical changes of the thoracolumbar spine with kelvin fixation.   Impression:       New 3 mm distal left ureteral calculus without significant hydroureter or hydronephrosis.    Cholelithiasis. No pericholecystic inflammation.     Imaging reviewed - both report and images personally reviewed.     Labs:  Recent Labs     01/24/24 0622   WBC 6.17     Recent Labs     01/24/24 0622   HGB 14.0       Recent Labs     01/24/24 0622   CREATININE 0.62       Microbiology:  Urine culture pending    History:  Social History     Socioeconomic History    Marital status: Single     Spouse name: None    Number of children: None    Years of education: None    Highest education level: None   Occupational History    None   Tobacco Use    Smoking status: Never    Smokeless tobacco: Never   Vaping Use    Vaping status: Never Used   Substance and Sexual Activity    Alcohol use: Not Currently    Drug use: Never    Sexual activity: None   Other Topics Concern    None   Social History Narrative    Does not consume caffeine     Social Determinants of Health     Financial Resource Strain: Low Risk  (7/18/2023)    Overall Financial Resource Strain (CARDIA)     Difficulty of Paying Living Expenses: Not hard at all   Food Insecurity: Not on file   Transportation Needs: No Transportation Needs (7/18/2023)    PRAPARE - Transportation     Lack of Transportation (Medical): No     Lack of Transportation (Non-Medical): No   Physical Activity: Not on file   Stress: Not on file    Social Connections: Not on file   Intimate Partner Violence: Not on file   Housing Stability: Not on file       Past Medical History:   Diagnosis Date    Hyperlipidemia     Hypothyroid      Past Surgical History:   Procedure Laterality Date    SPINE SURGERY      scoliosis repair with kelvin in spine - age 18     Family History   Problem Relation Age of Onset    Thyroid disease Mother     Coronary artery disease Mother     Heart disease Father     Stroke Father        Esther Reyes PA-C  Date: 1/24/2024 Time: 9:47 AM

## 2024-01-24 NOTE — ED ATTENDING ATTESTATION
1/24/2024  I, Petey Kumar MD, saw and evaluated the patient. I have discussed the patient with the resident/non-physician practitioner and agree with the resident's/non-physician practitioner's findings, Plan of Care, and MDM as documented in the resident's/non-physician practitioner's note, except where noted. All available labs and Radiology studies were reviewed.  I was present for key portions of any procedure(s) performed by the resident/non-physician practitioner and I was immediately available to provide assistance.       At this point I agree with the current assessment done in the Emergency Department.  I have conducted an independent evaluation of this patient a history and physical is as follows:        Final Diagnosis:  1. Urolithiasis      Chief Complaint   Patient presents with    Abdominal Pain     Pt with recent kidney stone.       68-year-old female recently diagnosed with kidney stone presenting with flank pain and nausea.  States that the pain was being controlled with Tylenol.  However, continued to have pain.      PMH:  -Hypothyroidism, hyperlipidemia  PSH:  -Not applicable      PE:   Vitals:    01/24/24 0536 01/24/24 0545 01/24/24 0620   BP: (!) 180/79 (!) 180/79 151/70   BP Location: Right arm Right arm Right arm   Pulse: 77 72 65   Resp: 18  18   Temp: 97.9 °F (36.6 °C)     TempSrc: Oral     SpO2: 97% 97% 100%         Constitutional: Vital signs are normal. She appears well-developed. She is cooperative. No distress.   HENT:   Mouth/Throat: Uvula is midline, oropharynx is clear and moist and mucous membranes are normal.   Eyes: Pupils are equal, round, and reactive to light. Conjunctivae and EOM are normal.   Neck: Trachea normal. No thyroid mass and no thyromegaly present.   Cardiovascular: Normal rate, regular rhythm, normal heart sounds.   No murmur heard.  Pulmonary/Chest: Effort normal and breath sounds normal.   Abdominal: Soft. Normal appearance and bowel sounds are normal. There  is no tenderness. There is no rebound, no guarding.   Neurological: She is alert.   Skin: Skin is warm, dry and intact.   Psychiatric: She has a normal mood and affect. Her speech is normal and behavior is normal. Thought content normal.      A:  -68-year-old female who presents with symptoms related to known kidney stone.    P:  -Explained to the patient that the stone is relatively small and should pass on its own.  -In the meantime, main treatment is pain control and nausea control.  Will increase pain regimen.  -CBC to evaluate for marked leukocytosis or anemia.  -BMP to ensure stone is not affecting kidney function.  -UA to evaluate for associated UTI.  -Add Flomax to her regimen.  -Follow-up with urology.    - 13 point ROS was performed and all are normal unless stated in the history above.   - Nursing note reviewed. Vitals reviewed.   - Orders placed by myself and/or advanced practitioner / resident.    - Previous chart was reviewed  - No language barrier.   - History obtained from patient.   - There are no limitations to the history obtained.   - Critical care time: Not applicable for this patient.          Medications   sodium chloride 0.9 % bolus 1,000 mL (1,000 mL Intravenous New Bag 1/24/24 0622)   morphine injection 2 mg (2 mg Intravenous Given 1/24/24 0623)     No orders to display     Orders Placed This Encounter   Procedures    CBC and differential    Comprehensive metabolic panel    UA w Reflex to Microscopic w Reflex to Culture    Measure post void residual     Labs Reviewed - No data to display  Time reflects when diagnosis was documented in both MDM as applicable and the Disposition within this note       Time User Action Codes Description Comment    1/24/2024  6:27 AM Ghassan Daley Add [N20.9] Urolithiasis           ED Disposition       ED Disposition   Discharge    Condition   Stable    Date/Time   Wed Jan 24, 2024  6:37 AM    Comment   Colt Palafox discharge to home/self care.           "         Follow-up Information    None       Patient's Medications   Discharge Prescriptions    TAMSULOSIN (FLOMAX) 0.4 MG    Take 1 capsule (0.4 mg total) by mouth daily with dinner       Start Date: 1/24/2024 End Date: --       Order Dose: 0.4 mg       Quantity: 14 capsule    Refills: 0     No discharge procedures on file.  Prior to Admission Medications   Prescriptions Last Dose Informant Patient Reported? Taking?   atorvastatin (LIPITOR) 10 mg tablet   No Yes   Sig: TAKE 1 TABLET BY MOUTH EVERY DAY   cholecalciferol (VITAMIN D3) 400 units tablet  Self Yes Yes   Sig: Take 400 Units by mouth daily   cyanocobalamin (VITAMIN B-12) 100 mcg tablet  Self Yes Yes   Sig: Take by mouth daily   levothyroxine 75 mcg tablet   No Yes   Sig: TAKE 1 TABLET (75 MCG TOTAL) BY MOUTH DAILY IN THE EARLY MORNING   methocarbamol (ROBAXIN) 500 mg tablet   No No   Sig: Take 1 tablet (500 mg total) by mouth 3 (three) times a day      Facility-Administered Medications: None       Portions of the record may have been created with voice recognition software. Occasional wrong word or \"sound a like\" substitutions may have occurred due to the inherent limitations of voice recognition software. Read the chart carefully and recognize, using context, where substitutions have occurred.     ED Course         Critical Care Time  Procedures      "

## 2024-01-24 NOTE — DISCHARGE INSTRUCTIONS
Take Flomax to help urinate, and cefdinir every 12 hours for ear infection.  Take Motrin and oxycodone as needed for pain.  Use Zofran for nausea.  Please call urology and set up an appointment to be evaluated as soon as possible.  Return to the emergency department if your symptoms worsen.      Return to the emergency department if:   You are urinating very little or not at all.    You have a high fever with shaking chills.    You have side or back pain that gets worse.

## 2024-01-25 ENCOUNTER — TELEPHONE (OUTPATIENT)
Age: 69
End: 2024-01-25

## 2024-01-25 NOTE — TELEPHONE ENCOUNTER
Patient seen at ED 1/24/24 UTI, Prescribed Rx: Cefdinir 300 mg capsule 1 Q12hr for 2 weeks.    Patient is having trouble swallowing capsule, requesting tables.    Pharmacy [facility].    Please review and advise  Thank you

## 2024-01-26 ENCOUNTER — OFFICE VISIT (OUTPATIENT)
Dept: FAMILY MEDICINE CLINIC | Facility: CLINIC | Age: 69
End: 2024-01-26
Payer: MEDICARE

## 2024-01-26 VITALS
OXYGEN SATURATION: 96 % | HEART RATE: 95 BPM | SYSTOLIC BLOOD PRESSURE: 140 MMHG | WEIGHT: 124.2 LBS | BODY MASS INDEX: 20.05 KG/M2 | DIASTOLIC BLOOD PRESSURE: 76 MMHG

## 2024-01-26 DIAGNOSIS — N30.00 ACUTE CYSTITIS WITHOUT HEMATURIA: Primary | ICD-10-CM

## 2024-01-26 DIAGNOSIS — E44.1 MILD PROTEIN-CALORIE MALNUTRITION (HCC): ICD-10-CM

## 2024-01-26 DIAGNOSIS — N20.0 NEPHROLITHIASIS: ICD-10-CM

## 2024-01-26 DIAGNOSIS — E87.6 HYPOKALEMIA: ICD-10-CM

## 2024-01-26 LAB — BACTERIA UR CULT: NORMAL

## 2024-01-26 PROCEDURE — 99214 OFFICE O/P EST MOD 30 MIN: CPT | Performed by: INTERNAL MEDICINE

## 2024-01-26 RX ORDER — CEFDINIR 125 MG/5ML
300 POWDER, FOR SUSPENSION ORAL 2 TIMES DAILY
Qty: 168 ML | Refills: 0 | Status: SHIPPED | OUTPATIENT
Start: 2024-01-26 | End: 2024-02-02

## 2024-01-26 NOTE — PROGRESS NOTES
Assessment/Plan:    Nephrolithiasis  Seems to be resolved.    Acute cystitis without hematuria  She will finish course of cefdinir for 7 days.  She could not tolerate capsules, oral solution was prescribed.       Diagnoses and all orders for this visit:    Acute cystitis without hematuria  -     cefdinir (OMNICEF) 125 mg/5 mL suspension; Take 12 mL (300 mg total) by mouth 2 (two) times a day for 7 days    Mild protein-calorie malnutrition (HCC)    Hypokalemia  -     Basic metabolic panel; Future    Nephrolithiasis          Subjective:      Patient ID: Colt Palafox is a 68 y.o. female.    Came today for follow-up after recent visit to emergency room with second episode of renal colic, CT scan revealed small 3 mm distal ureteral calculus that seems to be passed as patient does not report any active complaints.  She also looks lightheaded at the edge of UTI and she is currently on cefdinir.        The following portions of the patient's history were reviewed and updated as appropriate: allergies, current medications, past family history, past medical history, past social history, past surgical history, and problem list.    Review of Systems   Constitutional:  Negative for chills and fever.   Cardiovascular:  Negative for chest pain, palpitations and leg swelling.   Genitourinary:  Negative for difficulty urinating, dysuria, flank pain, frequency and urgency.         Objective:      /76 (BP Location: Right arm, Patient Position: Sitting, Cuff Size: Standard)   Pulse 95   Wt 56.3 kg (124 lb 3.2 oz)   SpO2 96%   BMI 20.05 kg/m²     Allergies   Allergen Reactions    Grapefruit Extract - Food Allergy Hives and Swelling    Ciprofloxacin      Temporary hearing loss    Penicillins Rash          Current Outpatient Medications:     atorvastatin (LIPITOR) 10 mg tablet, TAKE 1 TABLET BY MOUTH EVERY DAY, Disp: 90 tablet, Rfl: 2    cefdinir (OMNICEF) 125 mg/5 mL suspension, Take 12 mL (300 mg total) by mouth 2 (two)  times a day for 7 days, Disp: 168 mL, Rfl: 0    cholecalciferol (VITAMIN D3) 400 units tablet, Take 400 Units by mouth daily, Disp: , Rfl:     cyanocobalamin (VITAMIN B-12) 100 mcg tablet, Take by mouth daily, Disp: , Rfl:     ibuprofen (MOTRIN) 600 mg tablet, Take 1 tablet (600 mg total) by mouth every 6 (six) hours as needed for moderate pain, Disp: 30 tablet, Rfl: 0    levothyroxine 75 mcg tablet, TAKE 1 TABLET (75 MCG TOTAL) BY MOUTH DAILY IN THE EARLY MORNING, Disp: 90 tablet, Rfl: 2    ondansetron (ZOFRAN-ODT) 4 mg disintegrating tablet, Take 1 tablet (4 mg total) by mouth every 6 (six) hours as needed for nausea, Disp: 20 tablet, Rfl: 0    oxyCODONE (Roxicodone) 5 immediate release tablet, Take 1 tablet (5 mg total) by mouth every 4 (four) hours as needed for severe pain for up to 3 days Max Daily Amount: 30 mg, Disp: 10 tablet, Rfl: 0    tamsulosin (FLOMAX) 0.4 mg, Take 1 capsule (0.4 mg total) by mouth daily with dinner for 7 days, Disp: 7 capsule, Rfl: 0    methocarbamol (ROBAXIN) 500 mg tablet, Take 1 tablet (500 mg total) by mouth 3 (three) times a day, Disp: 30 tablet, Rfl: 1     There are no Patient Instructions on file for this visit.        Physical Exam  Constitutional:       General: She is not in acute distress.     Appearance: She is not ill-appearing or toxic-appearing.   Cardiovascular:      Rate and Rhythm: Normal rate.      Pulses: Normal pulses.   Pulmonary:      Effort: Pulmonary effort is normal.   Abdominal:      General: Abdomen is flat.

## 2024-01-26 NOTE — ASSESSMENT & PLAN NOTE
She will finish course of cefdinir for 7 days.  She could not tolerate capsules, oral solution was prescribed.

## 2024-01-27 ENCOUNTER — APPOINTMENT (EMERGENCY)
Dept: CT IMAGING | Facility: HOSPITAL | Age: 69
End: 2024-01-27
Payer: MEDICARE

## 2024-01-27 ENCOUNTER — HOSPITAL ENCOUNTER (EMERGENCY)
Facility: HOSPITAL | Age: 69
Discharge: HOME | End: 2024-01-27
Attending: EMERGENCY MEDICINE
Payer: MEDICARE

## 2024-01-27 VITALS
RESPIRATION RATE: 20 BRPM | DIASTOLIC BLOOD PRESSURE: 77 MMHG | TEMPERATURE: 98 F | HEART RATE: 86 BPM | WEIGHT: 125.22 LBS | BODY MASS INDEX: 20.21 KG/M2 | SYSTOLIC BLOOD PRESSURE: 166 MMHG | OXYGEN SATURATION: 97 %

## 2024-01-27 DIAGNOSIS — N20.1 URETEROLITHIASIS: Primary | ICD-10-CM

## 2024-01-27 LAB
ALBUMIN SERPL BCP-MCNC: 4.3 G/DL (ref 3.5–5)
ALP SERPL-CCNC: 81 U/L (ref 34–104)
ALT SERPL W P-5'-P-CCNC: 10 U/L (ref 7–52)
ANION GAP SERPL CALCULATED.3IONS-SCNC: 7 MMOL/L
AST SERPL W P-5'-P-CCNC: 16 U/L (ref 13–39)
BACTERIA UR QL AUTO: ABNORMAL /HPF
BASOPHILS # BLD AUTO: 0.04 THOUSANDS/ÂΜL (ref 0–0.1)
BASOPHILS NFR BLD AUTO: 1 % (ref 0–1)
BILIRUB SERPL-MCNC: 0.52 MG/DL (ref 0.2–1)
BILIRUB UR QL STRIP: NEGATIVE
BUN SERPL-MCNC: 10 MG/DL (ref 5–25)
CALCIUM SERPL-MCNC: 9 MG/DL (ref 8.4–10.2)
CHLORIDE SERPL-SCNC: 103 MMOL/L (ref 96–108)
CLARITY UR: CLEAR
CO2 SERPL-SCNC: 29 MMOL/L (ref 21–32)
COLOR UR: YELLOW
CREAT SERPL-MCNC: 0.62 MG/DL (ref 0.6–1.3)
EOSINOPHIL # BLD AUTO: 0.3 THOUSAND/ÂΜL (ref 0–0.61)
EOSINOPHIL NFR BLD AUTO: 4 % (ref 0–6)
ERYTHROCYTE [DISTWIDTH] IN BLOOD BY AUTOMATED COUNT: 11.8 % (ref 11.6–15.1)
GFR SERPL CREATININE-BSD FRML MDRD: 94 ML/MIN/1.73SQ M
GLUCOSE SERPL-MCNC: 103 MG/DL (ref 65–140)
GLUCOSE UR STRIP-MCNC: NEGATIVE MG/DL
HCT VFR BLD AUTO: 43.5 % (ref 34.8–46.1)
HGB BLD-MCNC: 13.9 G/DL (ref 11.5–15.4)
HGB UR QL STRIP.AUTO: ABNORMAL
IMM GRANULOCYTES # BLD AUTO: 0.03 THOUSAND/UL (ref 0–0.2)
IMM GRANULOCYTES NFR BLD AUTO: 0 % (ref 0–2)
KETONES UR STRIP-MCNC: NEGATIVE MG/DL
LEUKOCYTE ESTERASE UR QL STRIP: NEGATIVE
LYMPHOCYTES # BLD AUTO: 1.23 THOUSANDS/ÂΜL (ref 0.6–4.47)
LYMPHOCYTES NFR BLD AUTO: 16 % (ref 14–44)
MCH RBC QN AUTO: 30.3 PG (ref 26.8–34.3)
MCHC RBC AUTO-ENTMCNC: 32 G/DL (ref 31.4–37.4)
MCV RBC AUTO: 95 FL (ref 82–98)
MONOCYTES # BLD AUTO: 0.91 THOUSAND/ÂΜL (ref 0.17–1.22)
MONOCYTES NFR BLD AUTO: 12 % (ref 4–12)
MUCOUS THREADS UR QL AUTO: ABNORMAL
NEUTROPHILS # BLD AUTO: 5.17 THOUSANDS/ÂΜL (ref 1.85–7.62)
NEUTS SEG NFR BLD AUTO: 67 % (ref 43–75)
NITRITE UR QL STRIP: NEGATIVE
NON-SQ EPI CELLS URNS QL MICRO: ABNORMAL /HPF
NRBC BLD AUTO-RTO: 0 /100 WBCS
PH UR STRIP.AUTO: 7 [PH] (ref 4.5–8)
PLATELET # BLD AUTO: 284 THOUSANDS/UL (ref 149–390)
PMV BLD AUTO: 9.1 FL (ref 8.9–12.7)
POTASSIUM SERPL-SCNC: 3.2 MMOL/L (ref 3.5–5.3)
PROT SERPL-MCNC: 7.3 G/DL (ref 6.4–8.4)
PROT UR STRIP-MCNC: ABNORMAL MG/DL
RBC # BLD AUTO: 4.59 MILLION/UL (ref 3.81–5.12)
RBC #/AREA URNS AUTO: ABNORMAL /HPF
SODIUM SERPL-SCNC: 139 MMOL/L (ref 135–147)
SP GR UR STRIP.AUTO: 1.02 (ref 1–1.03)
UROBILINOGEN UR QL STRIP.AUTO: 0.2 E.U./DL
WBC # BLD AUTO: 7.68 THOUSAND/UL (ref 4.31–10.16)
WBC #/AREA URNS AUTO: ABNORMAL /HPF

## 2024-01-27 PROCEDURE — 87086 URINE CULTURE/COLONY COUNT: CPT

## 2024-01-27 PROCEDURE — 99284 EMERGENCY DEPT VISIT MOD MDM: CPT

## 2024-01-27 PROCEDURE — 81001 URINALYSIS AUTO W/SCOPE: CPT

## 2024-01-27 PROCEDURE — 80053 COMPREHEN METABOLIC PANEL: CPT | Performed by: PHYSICIAN ASSISTANT

## 2024-01-27 PROCEDURE — 74176 CT ABD & PELVIS W/O CONTRAST: CPT

## 2024-01-27 PROCEDURE — 96361 HYDRATE IV INFUSION ADD-ON: CPT

## 2024-01-27 PROCEDURE — 85025 COMPLETE CBC W/AUTO DIFF WBC: CPT | Performed by: PHYSICIAN ASSISTANT

## 2024-01-27 PROCEDURE — 99285 EMERGENCY DEPT VISIT HI MDM: CPT | Performed by: PHYSICIAN ASSISTANT

## 2024-01-27 PROCEDURE — 96375 TX/PRO/DX INJ NEW DRUG ADDON: CPT

## 2024-01-27 PROCEDURE — 36415 COLL VENOUS BLD VENIPUNCTURE: CPT | Performed by: PHYSICIAN ASSISTANT

## 2024-01-27 PROCEDURE — 96374 THER/PROPH/DIAG INJ IV PUSH: CPT

## 2024-01-27 PROCEDURE — G1004 CDSM NDSC: HCPCS

## 2024-01-27 RX ORDER — KETOROLAC TROMETHAMINE 30 MG/ML
15 INJECTION, SOLUTION INTRAMUSCULAR; INTRAVENOUS ONCE
Status: COMPLETED | OUTPATIENT
Start: 2024-01-27 | End: 2024-01-27

## 2024-01-27 RX ORDER — POTASSIUM CHLORIDE 20 MEQ/1
40 TABLET, EXTENDED RELEASE ORAL ONCE
Status: COMPLETED | OUTPATIENT
Start: 2024-01-27 | End: 2024-01-27

## 2024-01-27 RX ADMIN — MORPHINE SULFATE 2 MG: 2 INJECTION, SOLUTION INTRAMUSCULAR; INTRAVENOUS at 18:36

## 2024-01-27 RX ADMIN — SODIUM CHLORIDE 1000 ML: 0.9 INJECTION, SOLUTION INTRAVENOUS at 18:35

## 2024-01-27 RX ADMIN — KETOROLAC TROMETHAMINE 15 MG: 30 INJECTION, SOLUTION INTRAMUSCULAR; INTRAVENOUS at 18:36

## 2024-01-27 RX ADMIN — POTASSIUM CHLORIDE 40 MEQ: 1500 TABLET, EXTENDED RELEASE ORAL at 21:03

## 2024-01-28 NOTE — ED NOTES
Pt unable to swallow medication.  Pills broken in half but pt still unable to swallow.  Pills then crushed and placed in applesauce and pt able to swallow.      Marisol Sanchez RN  01/27/24 [id number]

## 2024-01-28 NOTE — DISCHARGE INSTRUCTIONS
- Take tylenol and motrin as directed on the box. Alternate between the two medications for pain management.  - Take the oxycodone as previously prescribed for severe break through pain. Do not drive or operate machinery when taking this medication.  - Continue taking antibiotics that have been prescribed by your PCP.  - Follow up with urology.

## 2024-01-28 NOTE — ED PROVIDER NOTES
History  Chief Complaint   Patient presents with    Abdominal Pain     Pt c/o left lower abdominal pain, reports recent ED visits for kidney stones and UTI.      Patient is a 68-year-old female with past medical history of hypothyroidism and hyperlipidemia who presents for evaluation of possible kidney stone.  Patient states that she started about 1.5 weeks ago with left-sided flank pain, hematuria, and nausea.  Patient was diagnosed with a kidney stone and then subsequently a UTI.  Patient has been seen 3 times in the past week for same.  She states that she was started on antibiotics recently.  She states that she took 1 dose of the capsule however she had difficulty swallowing this and she saw her family doctor yesterday who prescribed her cefdinir oral suspension.  She states she has had 2 doses of this so far.  She states she started again with left-sided flank and left lower quadrant abdominal pain last night.  She states she is only been taking Tylenol for pain.  She was prescribed ibuprofen and oxycodone which she states she has not taken.  She is also been alternating between constipation and diarrhea.  Bowel is nonbloody.  She has had nausea without vomiting.  She denies fever, chest pain, shortness of breath, dysuria, hematuria.        Prior to Admission Medications   Prescriptions Last Dose Informant Patient Reported? Taking?   atorvastatin (LIPITOR) 10 mg tablet   No No   Sig: TAKE 1 TABLET BY MOUTH EVERY DAY   cefdinir (OMNICEF) 125 mg/5 mL suspension   No No   Sig: Take 12 mL (300 mg total) by mouth 2 (two) times a day for 7 days   cholecalciferol (VITAMIN D3) 400 units tablet  Self Yes No   Sig: Take 400 Units by mouth daily   cyanocobalamin (VITAMIN B-12) 100 mcg tablet  Self Yes No   Sig: Take by mouth daily   ibuprofen (MOTRIN) 600 mg tablet   No No   Sig: Take 1 tablet (600 mg total) by mouth every 6 (six) hours as needed for moderate pain   levothyroxine 75 mcg tablet   No No   Sig: TAKE 1  TABLET (75 MCG TOTAL) BY MOUTH DAILY IN THE EARLY MORNING   methocarbamol (ROBAXIN) 500 mg tablet   No No   Sig: Take 1 tablet (500 mg total) by mouth 3 (three) times a day   ondansetron (ZOFRAN-ODT) 4 mg disintegrating tablet   No No   Sig: Take 1 tablet (4 mg total) by mouth every 6 (six) hours as needed for nausea   oxyCODONE (Roxicodone) 5 immediate release tablet   No No   Sig: Take 1 tablet (5 mg total) by mouth every 4 (four) hours as needed for severe pain for up to 3 days Max Daily Amount: 30 mg   tamsulosin (FLOMAX) 0.4 mg   No No   Sig: Take 1 capsule (0.4 mg total) by mouth daily with dinner for 7 days      Facility-Administered Medications: None       Past Medical History:   Diagnosis Date    Hyperlipidemia     Hypothyroid        Past Surgical History:   Procedure Laterality Date    SPINE SURGERY      scoliosis repair with kelvin in spine - age 18       Family History   Problem Relation Age of Onset    Thyroid disease Mother     Coronary artery disease Mother     Heart disease Father     Stroke Father      I have reviewed and agree with the history as documented.    E-Cigarette/Vaping    E-Cigarette Use Never User      E-Cigarette/Vaping Substances    Nicotine No     THC No     CBD No     Flavoring No     Other No     Unknown No      Social History     Tobacco Use    Smoking status: Never    Smokeless tobacco: Never   Vaping Use    Vaping status: Never Used   Substance Use Topics    Alcohol use: Not Currently    Drug use: Never       Review of Systems   Gastrointestinal:  Positive for abdominal pain, constipation, diarrhea and nausea. Negative for blood in stool.   Genitourinary:  Positive for flank pain. Negative for decreased urine volume.   All other systems reviewed and are negative.      Physical Exam  Physical Exam  Vitals and nursing note reviewed.   Constitutional:       General: She is not in acute distress.     Appearance: Normal appearance. She is well-developed. She is not ill-appearing,  toxic-appearing or diaphoretic.   HENT:      Head: Normocephalic and atraumatic.      Right Ear: External ear normal.      Left Ear: External ear normal.      Nose: Nose normal.   Eyes:      Conjunctiva/sclera: Conjunctivae normal.   Cardiovascular:      Rate and Rhythm: Normal rate and regular rhythm.      Heart sounds: Normal heart sounds.   Pulmonary:      Effort: Pulmonary effort is normal. No respiratory distress.      Breath sounds: Normal breath sounds. No stridor. No wheezing, rhonchi or rales.   Abdominal:      General: Abdomen is flat. Bowel sounds are normal. There is no distension.      Palpations: Abdomen is soft.      Tenderness: There is abdominal tenderness. There is left CVA tenderness. There is no guarding.   Musculoskeletal:         General: No swelling.      Cervical back: Normal range of motion.   Skin:     General: Skin is warm and dry.      Capillary Refill: Capillary refill takes less than 2 seconds.   Neurological:      Mental Status: She is alert.   Psychiatric:         Mood and Affect: Mood normal.         Vital Signs  ED Triage Vitals   Temperature Pulse Respirations Blood Pressure SpO2   01/27/24 1731 01/27/24 1731 01/27/24 1731 01/27/24 1731 01/27/24 1731   98 °F (36.7 °C) 89 18 150/83 96 %      Temp Source Heart Rate Source Patient Position - Orthostatic VS BP Location FiO2 (%)   01/27/24 1731 01/27/24 1731 01/27/24 2001 01/27/24 2001 --   Oral Monitor Lying Left arm       Pain Score       01/27/24 1731       4           Vitals:    01/27/24 1731 01/27/24 2001   BP: 150/83 166/77   Pulse: 89 86   Patient Position - Orthostatic VS:  Lying         Visual Acuity      ED Medications  Medications   potassium chloride (Klor-Con M20) CR tablet 40 mEq (has no administration in time range)   sodium chloride 0.9 % bolus 1,000 mL (1,000 mL Intravenous New Bag 1/27/24 1835)   ketorolac (TORADOL) injection 15 mg (15 mg Intravenous Given 1/27/24 1836)   morphine injection 2 mg (2 mg Intravenous Given  1/27/24 1836)       Diagnostic Studies  Results Reviewed       Procedure Component Value Units Date/Time    Urine Microscopic [[id number]]  (Abnormal) Collected: 01/27/24 1934    Lab Status: Final result Specimen: Urine, Clean Catch Updated: 01/27/24 1950     RBC, UA Innumerable /hpf      WBC, UA 10-20 /hpf      Epithelial Cells Occasional /hpf      Bacteria, UA Occasional /hpf      MUCUS THREADS Occasional    Urine culture [[id number]] Collected: 01/27/24 1934    Lab Status: In process Specimen: Urine, Clean Catch Updated: 01/27/24 1950    Urine Macroscopic, POC [[id number]]  (Abnormal) Collected: 01/27/24 1934    Lab Status: Final result Specimen: Urine Updated: 01/27/24 1935     Color, UA Yellow     Clarity, UA Clear     pH, UA 7.0     Leukocytes, UA Negative     Nitrite, UA Negative     Protein, UA Trace mg/dl      Glucose, UA Negative mg/dl      Ketones, UA Negative mg/dl      Urobilinogen, UA 0.2 E.U./dl      Bilirubin, UA Negative     Occult Blood, UA Large     Specific Gravity, UA 1.020    Narrative:      CLINITEK RESULT    Comprehensive metabolic panel [[id number]]  (Abnormal) Collected: 01/27/24 1838    Lab Status: Final result Specimen: Blood from Arm, Right Updated: 01/27/24 1859     Sodium 139 mmol/L      Potassium 3.2 mmol/L      Chloride 103 mmol/L      CO2 29 mmol/L      ANION GAP 7 mmol/L      BUN 10 mg/dL      Creatinine 0.62 mg/dL      Glucose 103 mg/dL      Calcium 9.0 mg/dL      AST 16 U/L      ALT 10 U/L      Alkaline Phosphatase 81 U/L      Total Protein 7.3 g/dL      Albumin 4.3 g/dL      Total Bilirubin 0.52 mg/dL      eGFR 94 ml/min/1.73sq m     Narrative:      National Kidney Disease Foundation guidelines for Chronic Kidney Disease (CKD):     Stage 1 with normal or high GFR (GFR > 90 mL/min/1.73 square meters)    Stage 2 Mild CKD (GFR = 60-89 mL/min/1.73 square meters)    Stage 3A Moderate CKD (GFR = 45-59 mL/min/1.73 square meters)    Stage 3B Moderate CKD (GFR = 30-44 mL/min/1.73 square  meters)    Stage 4 Severe CKD (GFR = 15-29 mL/min/1.73 square meters)    Stage 5 End Stage CKD (GFR <15 mL/min/1.73 square meters)  Note: GFR calculation is accurate only with a steady state creatinine    CBC and differential [[id number]] Collected: 01/27/24 1838    Lab Status: Final result Specimen: Blood from Arm, Right Updated: 01/27/24 1843     WBC 7.68 Thousand/uL      RBC 4.59 Million/uL      Hemoglobin 13.9 g/dL      Hematocrit 43.5 %      MCV 95 fL      MCH 30.3 pg      MCHC 32.0 g/dL      RDW 11.8 %      MPV 9.1 fL      Platelets 284 Thousands/uL      nRBC 0 /100 WBCs      Neutrophils Relative 67 %      Immat GRANS % 0 %      Lymphocytes Relative 16 %      Monocytes Relative 12 %      Eosinophils Relative 4 %      Basophils Relative 1 %      Neutrophils Absolute 5.17 Thousands/µL      Immature Grans Absolute 0.03 Thousand/uL      Lymphocytes Absolute 1.23 Thousands/µL      Monocytes Absolute 0.91 Thousand/µL      Eosinophils Absolute 0.30 Thousand/µL      Basophils Absolute 0.04 Thousands/µL                    CT renal stone study abdomen pelvis without contrast   Final Result by Reid Rosenberg MD (01/27 2046)      3 mm calculus in the distal left ureter approximately 1 cm proximal to the left ureterovesicular junction, previously 2.5 cm. There is mild left hydronephrosis, increased since prior exam.         Workstation performed: [id number]                    Procedures  Procedures         ED Course                               SBIRT 22yo+      Flowsheet Row Most Recent Value   Initial Alcohol Screen: US AUDIT-C     1. How often do you have a drink containing alcohol? 0 Filed at: 01/27/2024 1808   2. How many drinks containing alcohol do you have on a typical day you are drinking?  0 Filed at: 01/27/2024 1808   3b. FEMALE Any Age, or MALE 65+: How often do you have 4 or more drinks on one occassion? 0 Filed at: 01/27/2024 1808   Audit-C Score 0 Filed at: 01/27/2024 1808   BEAU: How many times in the past  year have you...    Used an illegal drug or used a prescription medication for non-medical reasons? Never Filed at: 01/27/2024 1808                      Medical Decision Making  Reviewed patient's prior medical records/ED visits.  Patient was here most recently on 1/24/2024 and was diagnosed with a UTI and new nephrolithiasis.  CT scan at that time showed New 3 mm distal left ureteral calculus without significant hydroureter or hydronephrosis.  Cholelithiasis. No pericholecystic inflammation.  Patient was given a dose of IV Rocephin at that visit.  She was discharged home with p.o. cefdinir, Zofran, ibuprofen, oxycodone.  Patient states she took 1 dose of this on 1/25/2024 and then followed up with her doctor yesterday, 1/26/2024 due to difficulty swallowing pills/capsules.  She was switched to liquid cefdinir at that time.  Patient states she has taken 2 doses of this total. Patient has plans to follow up outpatient with urology.   Patient states the pain got worse again last night.  Will recheck labs and CT scan.  Will give IV fluids, Zofran, Toradol and morphine here.  UA with innumerable red blood cells and 10-20 white blood cells.  No leukocytosis.  Hypokalemia noted again at 3.2.  Will replete with p.o.   Patient reassessed and informed of all results thus far.  She states her pain is currently controlled.  Signed out to CALIN ESPINOZA pending CT scan and final disposition.      Amount and/or Complexity of Data Reviewed  Labs: ordered. Decision-making details documented in ED Course.  Radiology: ordered.    Risk  Prescription drug management.             Disposition  Final diagnoses:   None     ED Disposition       None          Follow-up Information    None         Patient's Medications   Discharge Prescriptions    No medications on file       No discharge procedures on file.    PDMP Review       None            ED Provider  Electronically Signed by             Berenice Katz PA-C  01/27/24 0559

## 2024-01-28 NOTE — ED CARE HANDOFF
Emergency Department Sign Out Note        Sign out and transfer of care from Berenice Katz PA-C . See Separate Emergency Department note.     The patient, Colt Palafox, was evaluated by the previous provider for Abdominal Pain.    Workup Completed:  Results Reviewed       Procedure Component Value Units Date/Time    Urine Microscopic [[id number]]  (Abnormal) Collected: 01/27/24 1934    Lab Status: Final result Specimen: Urine, Clean Catch Updated: 01/27/24 1950     RBC, UA Innumerable /hpf      WBC, UA 10-20 /hpf      Epithelial Cells Occasional /hpf      Bacteria, UA Occasional /hpf      MUCUS THREADS Occasional    Urine culture [[id number]] Collected: 01/27/24 1934    Lab Status: In process Specimen: Urine, Clean Catch Updated: 01/27/24 1950    Urine Macroscopic, POC [[id number]]  (Abnormal) Collected: 01/27/24 1934    Lab Status: Final result Specimen: Urine Updated: 01/27/24 1935     Color, UA Yellow     Clarity, UA Clear     pH, UA 7.0     Leukocytes, UA Negative     Nitrite, UA Negative     Protein, UA Trace mg/dl      Glucose, UA Negative mg/dl      Ketones, UA Negative mg/dl      Urobilinogen, UA 0.2 E.U./dl      Bilirubin, UA Negative     Occult Blood, UA Large     Specific Gravity, UA 1.020    Narrative:      CLINITEK RESULT    Comprehensive metabolic panel [[id number]]  (Abnormal) Collected: 01/27/24 1838    Lab Status: Final result Specimen: Blood from Arm, Right Updated: 01/27/24 1859     Sodium 139 mmol/L      Potassium 3.2 mmol/L      Chloride 103 mmol/L      CO2 29 mmol/L      ANION GAP 7 mmol/L      BUN 10 mg/dL      Creatinine 0.62 mg/dL      Glucose 103 mg/dL      Calcium 9.0 mg/dL      AST 16 U/L      ALT 10 U/L      Alkaline Phosphatase 81 U/L      Total Protein 7.3 g/dL      Albumin 4.3 g/dL      Total Bilirubin 0.52 mg/dL      eGFR 94 ml/min/1.73sq m     Narrative:      National Kidney Disease Foundation guidelines for Chronic Kidney Disease (CKD):     Stage 1 with normal or high GFR (GFR > 90  mL/min/1.73 square meters)    Stage 2 Mild CKD (GFR = 60-89 mL/min/1.73 square meters)    Stage 3A Moderate CKD (GFR = 45-59 mL/min/1.73 square meters)    Stage 3B Moderate CKD (GFR = 30-44 mL/min/1.73 square meters)    Stage 4 Severe CKD (GFR = 15-29 mL/min/1.73 square meters)    Stage 5 End Stage CKD (GFR <15 mL/min/1.73 square meters)  Note: GFR calculation is accurate only with a steady state creatinine    CBC and differential [[id number]] Collected: 01/27/24 1838    Lab Status: Final result Specimen: Blood from Arm, Right Updated: 01/27/24 1843     WBC 7.68 Thousand/uL      RBC 4.59 Million/uL      Hemoglobin 13.9 g/dL      Hematocrit 43.5 %      MCV 95 fL      MCH 30.3 pg      MCHC 32.0 g/dL      RDW 11.8 %      MPV 9.1 fL      Platelets 284 Thousands/uL      nRBC 0 /100 WBCs      Neutrophils Relative 67 %      Immat GRANS % 0 %      Lymphocytes Relative 16 %      Monocytes Relative 12 %      Eosinophils Relative 4 %      Basophils Relative 1 %      Neutrophils Absolute 5.17 Thousands/µL      Immature Grans Absolute 0.03 Thousand/uL      Lymphocytes Absolute 1.23 Thousands/µL      Monocytes Absolute 0.91 Thousand/µL      Eosinophils Absolute 0.30 Thousand/µL      Basophils Absolute 0.04 Thousands/µL             ED Course / Workup Pending (followup):  CT renal stone study abdomen pelvis without contrast   Final Result by Reid Rosenberg MD (01/27 2046)      3 mm calculus in the distal left ureter approximately 1 cm proximal to the left ureterovesicular junction, previously 2.5 cm. There is mild left hydronephrosis, increased since prior exam.         Workstation performed: [id number]                       ED Course as of 01/28/24 0039   Sat Jan 27, 2024 2027 S/o EM PA-C. L flank pain since yesterday, h/o stones. Cefdinir for UTI x 2 doses. Pain meds at home, not taking. Dispo pending CT.    2052 CT renal stone study abdomen pelvis without contrast  IMPRESSION:     3 mm calculus in the distal left ureter  "approximately 1 cm proximal to the left ureterovesicular junction, previously 2.5 cm. There is mild left hydronephrosis, increased since prior exam.   2108 TT to Sudheer lombardo/ Urology in regards to patient.    2136 Discussed at length with the patient the need to take pain medications as prescribed.  Patient is only taking Tylenol without improvement of pain.  Explained to patient that she still has a left-sided 3 mm calculus just proximal to the UVJ.  Explained to patient that this is likely was causing her pain.  Patient is taking cefdinir already for UTI.  Urine today without significant leukocytosis or bacteria.  Patient has been afebrile.  Patient's pain is currently controlled.  Explained to patient to alternate between Tylenol and Motrin for pain control.  Patient should also take oxycodone as prescribed for severe breakthrough pain.  Recommend follow-up with urology.  Did reach out to urology in regards to possible earlier appointment for patient.   2152 Sudheer lombardo/ urology: \" I think if she's a bounce back x4 she's just going to continue continuously come back I think at this point in time can probably admit to medicine we can add her on for tomorrow.\" Discussed patient's social situation and her wanting to go home, urology is okay with this plan. Will have office fast track her follow up.     Procedures  Medical Decision Making    Patient given to me in signout pending CT.  Patient pain currently controlled.  Reviewed CT findings with patient.  Discussed with patient the need to pain control at home.  Patient is only taking Tylenol for pain control.  Encourage patient to take both Motrin and oxycodone as prescribed.  Patient states that she did not want to take the oxycodone due to it causing constipation.  Informed patient that we are giving her narcotics when she comes to the emergency department which is improving her pain and the oxycodone will help improve her pain at home.  Discussed with patient " strict return precautions which she verbalized understanding of.  Discussed with urology who states that they will have office contact patient for a sooner appointment.  Patient does have Flomax at home, was not taking as it was causing her to have diarrhea.  Encourage patient to take this medication.  Continue to take the cefdinir as prescribed for your urinary tract infection.    Prior to discharge, discharge instructions were discussed with patient at bedside. Patient was provided both verbal and written instructions. Patient is understanding of the discharge instructions and is agreeable to plan of care. Return precautions were discussed with patient bedside, patient verbalized understanding of signs and symptoms that would necessitate return to the ED. All questions were answered. Patient was comfortable with the plan of care and discharged to home.     Dispo: discharge home with follow up to Urology. Patient stable, in no acute distress and non-toxic at discharge.    Problems Addressed:  Ureterolithiasis: acute illness or injury    Amount and/or Complexity of Data Reviewed  Labs: ordered.  Radiology: ordered. Decision-making details documented in ED Course.    Risk  Prescription drug management.            Disposition  Final diagnoses:   Ureterolithiasis     Time reflects when diagnosis was documented in both MDM as applicable and the Disposition within this note       Time User Action Codes Description Comment    1/27/2024  9:31 PM Olga Mullen Add [N20.1] Ureterolithiasis           ED Disposition       ED Disposition   Discharge    Condition   Stable    Date/Time   Sat Jan 27, 2024  9:31 PM    Comment   Colt Palafox discharge to home/self care.                   Follow-up Information       Follow up With Specialties Details Why Contact Info Additional Information    [facility] Schedule an appointment as soon as possible for a visit   [address] [place] [address] [address]arciso Esc[address]era  [address]  [facility] [id number] [phone] [facility] [address], [place], [place], [address] [phone]          Discharge Medication List as of 1/27/2024  9:36 PM        CONTINUE these medications which have NOT CHANGED    Details   atorvastatin (LIPITOR) 10 mg tablet TAKE 1 TABLET BY MOUTH EVERY DAY, Starting Fri 9/8/2023, Normal      cefdinir (OMNICEF) 125 mg/5 mL suspension Take 12 mL (300 mg total) by mouth 2 (two) times a day for 7 days, Starting Fri 1/26/2024, Until Fri 2/2/2024, Normal      cholecalciferol (VITAMIN D3) 400 units tablet Take 400 Units by mouth daily, Historical Med      cyanocobalamin (VITAMIN B-12) 100 mcg tablet Take by mouth daily, Historical Med      ibuprofen (MOTRIN) 600 mg tablet Take 1 tablet (600 mg total) by mouth every 6 (six) hours as needed for moderate pain, Starting Wed 1/24/2024, Normal      levothyroxine 75 mcg tablet TAKE 1 TABLET (75 MCG TOTAL) BY MOUTH DAILY IN THE EARLY MORNING, Starting Thu 9/7/2023, Normal      methocarbamol (ROBAXIN) 500 mg tablet Take 1 tablet (500 mg total) by mouth 3 (three) times a day, Starting Wed 9/13/2023, Normal      ondansetron (ZOFRAN-ODT) 4 mg disintegrating tablet Take 1 tablet (4 mg total) by mouth every 6 (six) hours as needed for nausea, Starting Wed 1/24/2024, Normal      oxyCODONE (Roxicodone) 5 immediate release tablet Take 1 tablet (5 mg total) by mouth every 4 (four) hours as needed for severe pain for up to 3 days Max Daily Amount: 30 mg, Starting Wed 1/24/2024, Until Sat 1/27/2024 at 2359, Normal      tamsulosin (FLOMAX) 0.4 mg Take 1 capsule (0.4 mg total) by mouth daily with dinner for 7 days, Starting Wed 1/24/2024, Until Wed 1/31/2024, Normal           No discharge procedures on file.       ED Provider  Electronically Signed by CONSUELO CamC  01/28/24 0040

## 2024-01-29 ENCOUNTER — TELEPHONE (OUTPATIENT)
Dept: OTHER | Facility: HOSPITAL | Age: 69
End: 2024-01-29

## 2024-01-29 DIAGNOSIS — N20.1 URETERAL STONE: ICD-10-CM

## 2024-01-29 DIAGNOSIS — N20.9 UROLITHIASIS: ICD-10-CM

## 2024-01-29 DIAGNOSIS — N20.0 NEPHROLITHIASIS: Primary | ICD-10-CM

## 2024-01-29 LAB — BACTERIA UR CULT: NORMAL

## 2024-01-29 NOTE — TELEPHONE ENCOUNTER
Patient is a 68-year-old female who originally presented to the emergency room on 1/18 diagnosed with a 3 mm distal ureteral calculus on the left.  With mild hydronephrosis.  She has had x 4 bounce back to the emergency room since diagnosis on the 1/18 but has been declining admission for pain control as she takes care of her 90-year-old mother at home. CT scan reveals stone still present.    Due to multiple bounce backs.  I am wondering if we can add her on for fast-track later this week.    She has a culture pending.    Can we see if we can accommodate her.

## 2024-01-30 NOTE — TELEPHONE ENCOUNTER
Called patient to schedule fast track cysto/ L URS/ L RGP/ L stent. Patient was very confused as she stated she knew nothing about any of this and never discussed the procedure with anybody. Patient stated she does have an upcoming appointment with Christal on 2/16 for an ED follow up. Patient would like someone to reach out to discuss surgery.

## 2024-01-31 RX ORDER — TAMSULOSIN HYDROCHLORIDE 0.4 MG/1
0.4 CAPSULE ORAL
Qty: 30 CAPSULE | Refills: 0 | Status: SHIPPED | OUTPATIENT
Start: 2024-01-31

## 2024-01-31 NOTE — TELEPHONE ENCOUNTER
I called patient today to discuss her symptoms.  She states that she is asymptomatic at this point in time.  She felt that her pain was possibly related to constipation from antibiotic.  She is currently pain-free and after discussion due to the size of her stone and the location of that we will continue with observation.  I reordered Flomax.  Will plan to get a repeat CT scan in a few weeks to ensure that stone is not present.  Patient will continue strainer given in ER.  There will be no surgical intervention this week at this point in time    Patient is aware that she passes a stone to call the office and we will cancel CT scan at that point in time.

## 2024-02-01 NOTE — TELEPHONE ENCOUNTER
Date: 2/26/2024     Time: 10:30 AM     Visit Type: CT RNL STN STDY ABD PEL WO CON [[id number]]     Provider: AL CT 1 Department: AL CT SCAN     Pt has been contacted and scheduled as above.

## 2024-02-16 ENCOUNTER — OFFICE VISIT (OUTPATIENT)
Dept: UROLOGY | Facility: MEDICAL CENTER | Age: 69
End: 2024-02-16
Payer: MEDICARE

## 2024-02-16 VITALS
HEART RATE: 72 BPM | WEIGHT: 121 LBS | SYSTOLIC BLOOD PRESSURE: 120 MMHG | DIASTOLIC BLOOD PRESSURE: 76 MMHG | HEIGHT: 66 IN | BODY MASS INDEX: 19.44 KG/M2

## 2024-02-16 DIAGNOSIS — N20.1 URETERAL CALCULUS, LEFT: Primary | ICD-10-CM

## 2024-02-16 PROCEDURE — 99213 OFFICE O/P EST LOW 20 MIN: CPT | Performed by: PHYSICIAN ASSISTANT

## 2024-02-16 NOTE — PROGRESS NOTES
"2/16/2024      No chief complaint on file.        Assessment and Plan    68 y.o. female     1. Ureteral Calculus   -3mm L distal   -Suspects that she passed the stone. No symptoms after last ER visit.   -Follow up CT next week, assuming no stone, follow up 1 week.     History of Present Illness  Colt Palafox is a 68 y.o. female here for evaluation of 3mm distal L ureteral calculus with mild hydronephrosis. She has had multiple ER visits since dx of stone on 1/18 in ER. 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 She was treated with Flomax, now stopped. Urine culture with no growth. Today, she reports continued relief of symptoms (she denies hematuria, dysuria, flank pain, chills, N/V). She suspects that she passed the stone but is still unsure. She is agreeable with plan to follow up with CT in 1 weeks and if no stone, will follow up 1 year. Basic education on diet for stone prevention given.         Review of Systems   Constitutional:  Negative for chills and fever.   Genitourinary:  Negative for dysuria, flank pain and hematuria.                Vitals  Vitals:    02/16/24 0953   BP: 120/76   Pulse: 72   Weight: 54.9 kg (121 lb)   Height: 5' 6\" (1.676 m)       Physical Exam  Constitutional:       General: She is not in acute distress.     Appearance: Normal appearance. She is not ill-appearing, toxic-appearing or diaphoretic.   HENT:      Head: Normocephalic and atraumatic.   Pulmonary:      Effort: Pulmonary effort is normal.   Neurological:      General: No focal deficit present.      Mental Status: She is alert.   Psychiatric:         Mood and Affect: Mood normal.           Past History  Past Medical History:   Diagnosis Date    Hyperlipidemia     Hypothyroid      Social History     Socioeconomic History    Marital status: Single     Spouse name: None    Number of children: None    Years of education: None    Highest education level: None " "  Occupational History    None   Tobacco Use    Smoking status: Never    Smokeless tobacco: Never   Vaping Use    Vaping status: Never Used   Substance and Sexual Activity    Alcohol use: Not Currently    Drug use: Never    Sexual activity: None   Other Topics Concern    None   Social History Narrative    Does not consume caffeine     Social Determinants of Health     Financial Resource Strain: Low Risk  (7/18/2023)    Overall Financial Resource Strain (CARDIA)     Difficulty of Paying Living Expenses: Not hard at all   Food Insecurity: Not on file   Transportation Needs: No Transportation Needs (7/18/2023)    PRAPARE - Transportation     Lack of Transportation (Medical): No     Lack of Transportation (Non-Medical): No   Physical Activity: Not on file   Stress: Not on file   Social Connections: Not on file   Intimate Partner Violence: Not on file   Housing Stability: Not on file     Social History     Tobacco Use   Smoking Status Never   Smokeless Tobacco Never     Family History   Problem Relation Age of Onset    Thyroid disease Mother     Coronary artery disease Mother     Heart disease Father     Stroke Father        The following portions of the patient's history were reviewed and updated as appropriate: allergies, current medications, past medical history, past social history, past surgical history and problem list.    Results  No results found for this or any previous visit (from the past 1 hour(s)).]  No results found for: \"PSA\"  Lab Results   Component Value Date    CALCIUM 9.0 01/27/2024    K 3.2 (L) 01/27/2024    CO2 29 01/27/2024     01/27/2024    BUN 10 01/27/2024    CREATININE 0.62 01/27/2024     Lab Results   Component Value Date    WBC 7.68 01/27/2024    HGB 13.9 01/27/2024    HCT 43.5 01/27/2024    MCV 95 01/27/2024     01/27/2024       "

## 2024-02-21 PROBLEM — N30.00 ACUTE CYSTITIS WITHOUT HEMATURIA: Status: RESOLVED | Noted: 2024-01-26 | Resolved: 2024-02-21

## 2024-02-26 ENCOUNTER — HOSPITAL ENCOUNTER (OUTPATIENT)
Dept: CT IMAGING | Facility: HOSPITAL | Age: 69
Discharge: HOME | End: 2024-02-26
Payer: MEDICARE

## 2024-02-26 DIAGNOSIS — N20.1 URETERAL STONE: ICD-10-CM

## 2024-02-26 PROCEDURE — 74176 CT ABD & PELVIS W/O CONTRAST: CPT

## 2024-02-26 PROCEDURE — G1004 CDSM NDSC: HCPCS

## 2024-03-08 ENCOUNTER — TELEPHONE (OUTPATIENT)
Dept: UROLOGY | Facility: AMBULATORY SURGERY CENTER | Age: 69
End: 2024-03-08

## 2024-03-08 DIAGNOSIS — N28.1 RENAL CYST: Primary | ICD-10-CM

## 2024-03-08 NOTE — TELEPHONE ENCOUNTER
Pt aware of note below, central scheduling number given to schedule U/S      ----- Message from Benigno Elliott PA-C sent at 3/8/2024  9:33 AM EST -----  Please call patient to inform her that her stone has passed.  However there is still visualization of a possible cyst in the left kidney.  Radiology is recommending that she obtain a renal ultrasound for further characterization.  Please have patient schedule this ultrasound within the next few weeks.  We will call her with results

## 2024-03-26 ENCOUNTER — TELEPHONE (OUTPATIENT)
Dept: FAMILY MEDICINE CLINIC | Facility: CLINIC | Age: 69
End: 2024-03-26

## 2024-03-26 NOTE — TELEPHONE ENCOUNTER
Pt has been informed she is due for mammogram and colonoscopy. She stated she is going through something right now and will schedule later.

## 2024-05-03 ENCOUNTER — APPOINTMENT (OUTPATIENT)
Dept: LAB | Facility: MEDICAL CENTER | Age: 69
End: 2024-05-03
Payer: MEDICARE

## 2024-05-03 DIAGNOSIS — E87.6 HYPOKALEMIA: ICD-10-CM

## 2024-05-03 LAB
ANION GAP SERPL CALCULATED.3IONS-SCNC: 7 MMOL/L (ref 4–13)
BUN SERPL-MCNC: 12 MG/DL (ref 5–25)
CALCIUM SERPL-MCNC: 8.8 MG/DL (ref 8.4–10.2)
CHLORIDE SERPL-SCNC: 105 MMOL/L (ref 96–108)
CO2 SERPL-SCNC: 28 MMOL/L (ref 21–32)
CREAT SERPL-MCNC: 0.58 MG/DL (ref 0.6–1.3)
GFR SERPL CREATININE-BSD FRML MDRD: 96 ML/MIN/1.73SQ M
GLUCOSE P FAST SERPL-MCNC: 91 MG/DL (ref 65–99)
POTASSIUM SERPL-SCNC: 3.5 MMOL/L (ref 3.5–5.3)
SODIUM SERPL-SCNC: 140 MMOL/L (ref 135–147)

## 2024-05-03 PROCEDURE — 80048 BASIC METABOLIC PNL TOTAL CA: CPT

## 2024-05-03 PROCEDURE — 36415 COLL VENOUS BLD VENIPUNCTURE: CPT

## 2024-05-24 DIAGNOSIS — E03.9 ACQUIRED HYPOTHYROIDISM: ICD-10-CM

## 2024-05-24 DIAGNOSIS — E78.2 MIXED HYPERLIPIDEMIA: ICD-10-CM

## 2024-05-24 RX ORDER — ATORVASTATIN CALCIUM 10 MG/1
10 TABLET, FILM COATED ORAL DAILY
Qty: 90 TABLET | Refills: 1 | Status: SHIPPED | OUTPATIENT
Start: 2024-05-24

## 2024-05-25 RX ORDER — LEVOTHYROXINE SODIUM 0.07 MG/1
75 TABLET ORAL
Qty: 90 TABLET | Refills: 1 | Status: SHIPPED | OUTPATIENT
Start: 2024-05-25

## 2024-07-10 ENCOUNTER — OFFICE VISIT (OUTPATIENT)
Dept: FAMILY MEDICINE CLINIC | Facility: CLINIC | Age: 69
End: 2024-07-10
Payer: MEDICARE

## 2024-07-10 ENCOUNTER — APPOINTMENT (OUTPATIENT)
Dept: LAB | Facility: MEDICAL CENTER | Age: 69
End: 2024-07-10
Payer: MEDICARE

## 2024-07-10 VITALS
HEART RATE: 74 BPM | OXYGEN SATURATION: 95 % | TEMPERATURE: 99.4 F | SYSTOLIC BLOOD PRESSURE: 126 MMHG | DIASTOLIC BLOOD PRESSURE: 72 MMHG | BODY MASS INDEX: 19.29 KG/M2 | RESPIRATION RATE: 20 BRPM | HEIGHT: 66 IN | WEIGHT: 120 LBS

## 2024-07-10 DIAGNOSIS — E03.9 ACQUIRED HYPOTHYROIDISM: ICD-10-CM

## 2024-07-10 DIAGNOSIS — Z00.00 MEDICARE ANNUAL WELLNESS VISIT, SUBSEQUENT: Primary | ICD-10-CM

## 2024-07-10 DIAGNOSIS — D51.9 ANEMIA DUE TO VITAMIN B12 DEFICIENCY, UNSPECIFIED B12 DEFICIENCY TYPE: ICD-10-CM

## 2024-07-10 DIAGNOSIS — Z12.11 SCREENING FOR COLORECTAL CANCER: ICD-10-CM

## 2024-07-10 DIAGNOSIS — I34.0 NONRHEUMATIC MITRAL VALVE REGURGITATION: ICD-10-CM

## 2024-07-10 DIAGNOSIS — E78.2 MIXED HYPERLIPIDEMIA: ICD-10-CM

## 2024-07-10 DIAGNOSIS — Z12.12 SCREENING FOR COLORECTAL CANCER: ICD-10-CM

## 2024-07-10 DIAGNOSIS — Z23 NEED FOR COVID-19 VACCINE: ICD-10-CM

## 2024-07-10 DIAGNOSIS — Z12.31 ENCOUNTER FOR SCREENING MAMMOGRAM FOR MALIGNANT NEOPLASM OF BREAST: ICD-10-CM

## 2024-07-10 DIAGNOSIS — M41.124 ADOLESCENT IDIOPATHIC SCOLIOSIS OF THORACIC REGION: ICD-10-CM

## 2024-07-10 PROBLEM — E44.1 MILD PROTEIN-CALORIE MALNUTRITION (HCC): Status: RESOLVED | Noted: 2024-01-26 | Resolved: 2024-07-10

## 2024-07-10 PROBLEM — M54.9 ACUTE UPPER BACK PAIN: Status: RESOLVED | Noted: 2023-09-13 | Resolved: 2024-07-10

## 2024-07-10 PROBLEM — N20.0 NEPHROLITHIASIS: Status: RESOLVED | Noted: 2024-01-24 | Resolved: 2024-07-10

## 2024-07-10 LAB
ALBUMIN SERPL BCG-MCNC: 4.1 G/DL (ref 3.5–5)
ALP SERPL-CCNC: 82 U/L (ref 34–104)
ALT SERPL W P-5'-P-CCNC: 11 U/L (ref 7–52)
ANION GAP SERPL CALCULATED.3IONS-SCNC: 9 MMOL/L (ref 4–13)
AST SERPL W P-5'-P-CCNC: 15 U/L (ref 13–39)
BASOPHILS # BLD AUTO: 0.04 THOUSANDS/ÂΜL (ref 0–0.1)
BASOPHILS NFR BLD AUTO: 1 % (ref 0–1)
BILIRUB SERPL-MCNC: 0.53 MG/DL (ref 0.2–1)
BUN SERPL-MCNC: 14 MG/DL (ref 5–25)
CALCIUM SERPL-MCNC: 9.1 MG/DL (ref 8.4–10.2)
CHLORIDE SERPL-SCNC: 103 MMOL/L (ref 96–108)
CO2 SERPL-SCNC: 29 MMOL/L (ref 21–32)
CREAT SERPL-MCNC: 0.54 MG/DL (ref 0.6–1.3)
EOSINOPHIL # BLD AUTO: 0.26 THOUSAND/ÂΜL (ref 0–0.61)
EOSINOPHIL NFR BLD AUTO: 4 % (ref 0–6)
ERYTHROCYTE [DISTWIDTH] IN BLOOD BY AUTOMATED COUNT: 11.9 % (ref 11.6–15.1)
GFR SERPL CREATININE-BSD FRML MDRD: 97 ML/MIN/1.73SQ M
GLUCOSE SERPL-MCNC: 79 MG/DL (ref 65–140)
HCT VFR BLD AUTO: 44.6 % (ref 34.8–46.1)
HGB BLD-MCNC: 14.6 G/DL (ref 11.5–15.4)
IMM GRANULOCYTES # BLD AUTO: 0.01 THOUSAND/UL (ref 0–0.2)
IMM GRANULOCYTES NFR BLD AUTO: 0 % (ref 0–2)
LYMPHOCYTES # BLD AUTO: 1.26 THOUSANDS/ÂΜL (ref 0.6–4.47)
LYMPHOCYTES NFR BLD AUTO: 19 % (ref 14–44)
MCH RBC QN AUTO: 30.7 PG (ref 26.8–34.3)
MCHC RBC AUTO-ENTMCNC: 32.7 G/DL (ref 31.4–37.4)
MCV RBC AUTO: 94 FL (ref 82–98)
MONOCYTES # BLD AUTO: 0.76 THOUSAND/ÂΜL (ref 0.17–1.22)
MONOCYTES NFR BLD AUTO: 12 % (ref 4–12)
NEUTROPHILS # BLD AUTO: 4.21 THOUSANDS/ÂΜL (ref 1.85–7.62)
NEUTS SEG NFR BLD AUTO: 64 % (ref 43–75)
NRBC BLD AUTO-RTO: 0 /100 WBCS
PLATELET # BLD AUTO: 300 THOUSANDS/UL (ref 149–390)
PMV BLD AUTO: 9.3 FL (ref 8.9–12.7)
POTASSIUM SERPL-SCNC: 3.7 MMOL/L (ref 3.5–5.3)
PROT SERPL-MCNC: 7.1 G/DL (ref 6.4–8.4)
RBC # BLD AUTO: 4.76 MILLION/UL (ref 3.81–5.12)
SODIUM SERPL-SCNC: 141 MMOL/L (ref 135–147)
TSH SERPL DL<=0.05 MIU/L-ACNC: 0.5 UIU/ML (ref 0.45–4.5)
VIT B12 SERPL-MCNC: 874 PG/ML (ref 180–914)
WBC # BLD AUTO: 6.54 THOUSAND/UL (ref 4.31–10.16)

## 2024-07-10 PROCEDURE — 82607 VITAMIN B-12: CPT

## 2024-07-10 PROCEDURE — 90480 ADMN SARSCOV2 VAC 1/ONLY CMP: CPT

## 2024-07-10 PROCEDURE — 99214 OFFICE O/P EST MOD 30 MIN: CPT | Performed by: FAMILY MEDICINE

## 2024-07-10 PROCEDURE — G0439 PPPS, SUBSEQ VISIT: HCPCS | Performed by: FAMILY MEDICINE

## 2024-07-10 PROCEDURE — 36415 COLL VENOUS BLD VENIPUNCTURE: CPT

## 2024-07-10 PROCEDURE — 80053 COMPREHEN METABOLIC PANEL: CPT

## 2024-07-10 PROCEDURE — 84443 ASSAY THYROID STIM HORMONE: CPT

## 2024-07-10 PROCEDURE — 91320 SARSCV2 VAC 30MCG TRS-SUC IM: CPT

## 2024-07-10 PROCEDURE — 85025 COMPLETE CBC W/AUTO DIFF WBC: CPT

## 2024-07-10 NOTE — ASSESSMENT & PLAN NOTE
Check thyroid labs  Orders:    CBC and differential; Future    TSH, 3rd generation with Free T4 reflex; Future

## 2024-07-10 NOTE — ASSESSMENT & PLAN NOTE
She has a history of remote surgery at age 18.  She has some chronic back pain but this is tolerable at this time.

## 2024-07-10 NOTE — ASSESSMENT & PLAN NOTE
Check lipids  Orders:    CBC and differential; Future    Comprehensive metabolic panel; Future    LDL cholesterol, direct; Future

## 2024-07-10 NOTE — ASSESSMENT & PLAN NOTE
"She has a history of \"mitral valve prolapse\".  She has no murmur on exam today.  She has not had an echo in many years and declines that today.       "

## 2024-07-10 NOTE — PROGRESS NOTES
Ambulatory Visit  Name: Colt Palafox      : 1955      MRN: [id number]  Encounter Provider: Damien Gibbs Jr, MD  Encounter Date: 7/10/2024   Encounter department: [facility]    Assessment & Plan  Medicare annual wellness visit, subsequent         Acquired hypothyroidism    Orders:  •  CBC and differential; Future  •  TSH, 3rd generation with Free T4 reflex; Future    Mixed hyperlipidemia    Orders:  •  CBC and differential; Future  •  Comprehensive metabolic panel; Future  •  LDL cholesterol, direct; Future    Nonrheumatic mitral valve regurgitation         Screening for colorectal cancer    Orders:  •  Occult Blood, Fecal Immunochemical (FIT); Future    Encounter for screening mammogram for malignant neoplasm of breast    Orders:  •  Mammo screening bilateral w 3d & cad; Future    Anemia due to vitamin B12 deficiency, unspecified B12 deficiency type    Orders:  •  Vitamin B12; Future    Need for COVID-19 vaccine    Orders:  •  COVID-19 Pfizer mRNA vaccine 12 yr and older (GRAY cap vial or pre-filled syringe)           Depression Screening and Follow-up Plan: Patient was screened for depression during today's encounter. They screened negative with a PHQ-2 score of 0.    Preventive health issues were discussed with patient, and age appropriate screening tests were ordered as noted in patient's After Visit Summary. Personalized health advice and appropriate referrals for health education or preventive services given if needed, as noted in patient's After Visit Summary.    History of Present Illness     HPI   Patient Care Team:  James Cobb MD as PCP - General (Family Medicine)    Review of Systems   Constitutional:  Negative for appetite change, chills, fatigue, fever and unexpected weight change.   HENT:  Negative for trouble swallowing.    Eyes:  Negative for visual disturbance.   Respiratory:  Negative for cough, chest tightness, shortness of breath and  wheezing.    Cardiovascular:  Negative for chest pain, palpitations and leg swelling.   Gastrointestinal:  Negative for abdominal distention, abdominal pain, blood in stool, constipation and diarrhea.   Endocrine: Negative for polyuria.   Genitourinary:  Negative for difficulty urinating and flank pain.   Musculoskeletal:  Negative for arthralgias and myalgias.   Skin:  Negative for rash.   Neurological:  Negative for dizziness and light-headedness.   Hematological:  Negative for adenopathy. Does not bruise/bleed easily.   Psychiatric/Behavioral:  Negative for dysphoric mood and sleep disturbance. The patient is not nervous/anxious.      Medical History Reviewed by provider this encounter:       Annual Wellness Visit Questionnaire       Health Risk Assessment:   Patient rates overall health as very good. Patient feels that their physical health rating is same. Patient is satisfied with their life. Eyesight was rated as same. Hearing was rated as same. Patient feels that their emotional and mental health rating is same. Patients states they are never, rarely angry. Patient states they are sometimes unusually tired/fatigued. Pain experienced in the last 7 days has been none. Patient states that she has experienced no weight loss or gain in last 6 months.     Depression Screening:   PHQ-2 Score: 0      Fall Risk Screening:   In the past year, patient has experienced: no history of falling in past year      Urinary Incontinence Screening:   Patient has not leaked urine accidently in the last six months.     Home Safety:  Patient does not have trouble with stairs inside or outside of their home. Patient has working smoke alarms and has working carbon monoxide detector. Home safety hazards include: none.     Nutrition:   Current diet is Regular.     Medications:   Patient is currently taking over-the-counter supplements. OTC medications include: see medication list. Patient is able to manage medications.     Activities of  Daily Living (ADLs)/Instrumental Activities of Daily Living (IADLs):   Walk and transfer into and out of bed and chair?: Yes  Dress and groom yourself?: Yes    Bathe or shower yourself?: Yes    Feed yourself? Yes  Do your laundry/housekeeping?: Yes  Manage your money, pay your bills and track your expenses?: Yes  Make your own meals?: Yes    Do your own shopping?: Yes    Previous Hospitalizations:   Any hospitalizations or ED visits within the last 12 months?: Yes    How many hospitalizations have you had in the last year?: 1-2    Hospitalization Comments: kidney stones    Advance Care Planning:   Living will: Yes    Durable POA for healthcare: Yes    Advanced directive: Yes    End of Life Decisions reviewed with patient: Yes      Cognitive Screening:   Provider or family/friend/caregiver concerned regarding cognition?: No    PREVENTIVE SCREENINGS      Cardiovascular Screening:    General: Screening Not Indicated and History Lipid Disorder      Diabetes Screening:     General: Screening Current      Cervical Cancer Screening:    General: Screening Not Indicated      Lung Cancer Screening:     General: Screening Not Indicated      Hepatitis C Screening:    General: Screening Current    Screening, Brief Intervention, and Referral to Treatment (SBIRT)    Screening  Typical number of drinks in a day: 0  Typical number of drinks in a week: 0  Interpretation: Low risk drinking behavior.    AUDIT-C Screenin) How often did you have a drink containing alcohol in the past year? never  2) How many drinks did you have on a typical day when you were drinking in the past year? 0  3) How often did you have 6 or more drinks on one occasion in the past year? never    AUDIT-C Score: 0  Interpretation: Score 0-2 (female): Negative screen for alcohol misuse    Single Item Drug Screening:  How often have you used an illegal drug (including marijuana) or a prescription medication for non-medical reasons in the past year?  "never    Single Item Drug Screen Score: 0  Interpretation: Negative screen for possible drug use disorder    Social Determinants of Health     Financial Resource Strain: Low Risk  (7/18/2023)    Overall Financial Resource Strain (CARDIA)    • Difficulty of Paying Living Expenses: Not hard at all   Food Insecurity: No Food Insecurity (7/10/2024)    Hunger Vital Sign    • Worried About Running Out of Food in the Last Year: Never true    • Ran Out of Food in the Last Year: Never true   Transportation Needs: No Transportation Needs (7/10/2024)    PRAPARE - Transportation    • Lack of Transportation (Medical): No    • Lack of Transportation (Non-Medical): No   Housing Stability: Low Risk  (7/10/2024)    Housing Stability Vital Sign    • Unable to Pay for Housing in the Last Year: No    • Number of Times Moved in the Last Year: 1    • Homeless in the Last Year: No   Utilities: Not At Risk (7/10/2024)    [facility] Utilities    • Threatened with loss of utilities: No     No results found.    Objective     /72 (BP Location: Left arm, Patient Position: Sitting, Cuff Size: Standard)   Pulse 74   Temp 99.4 °F (37.4 °C) (Skin)   Resp 20   Ht 5' 6\" (1.676 m)   Wt 54.4 kg (120 lb)   SpO2 95%   BMI 19.37 kg/m²     Physical Exam  Constitutional:       General: She is not in acute distress.     Appearance: She is well-developed. She is not diaphoretic.   HENT:      Head: Normocephalic.      Right Ear: External ear normal.      Left Ear: External ear normal.      Nose: Nose normal.   Eyes:      General: No scleral icterus.        Right eye: No discharge.         Left eye: No discharge.      Conjunctiva/sclera: Conjunctivae normal.      Pupils: Pupils are equal, round, and reactive to light.   Neck:      Thyroid: No thyromegaly.      Trachea: No tracheal deviation.   Cardiovascular:      Rate and Rhythm: Normal rate and regular rhythm.      Heart sounds: Normal heart sounds. No murmur heard.  Pulmonary:      Effort: Pulmonary " effort is normal. No respiratory distress.      Breath sounds: Normal breath sounds. No stridor. No wheezing, rhonchi or rales.   Abdominal:      General: Bowel sounds are normal. There is no distension.      Palpations: Abdomen is soft.      Tenderness: There is no abdominal tenderness. There is no guarding.   Musculoskeletal:         General: No tenderness or deformity. Normal range of motion.      Right lower leg: No edema.      Left lower leg: No edema.   Lymphadenopathy:      Cervical: No cervical adenopathy.   Skin:     General: Skin is warm.      Coloration: Skin is not pale.      Findings: No erythema or rash.   Neurological:      Cranial Nerves: No cranial nerve deficit.      Coordination: Coordination normal.   Psychiatric:         Mood and Affect: Mood normal.         Behavior: Behavior normal.         Thought Content: Thought content normal.

## 2024-07-10 NOTE — ASSESSMENT & PLAN NOTE
Orders:    CBC and differential; Future    Comprehensive metabolic panel; Future    LDL cholesterol, direct; Future

## 2024-07-10 NOTE — PROGRESS NOTES
"Ambulatory Visit  Name: Colt Palafox      : 1955      MRN: [id number]  Encounter Provider: Damien Gibbs Jr, MD  Encounter Date: 7/10/2024   Encounter department: [facility]    Assessment & Plan  Medicare annual wellness visit, subsequent  She is due for cancer screening as well as mammogram.  This was discussed extensively.  She is hesitant but has agreed to a fit test and will consider mammogram.  She would like to proceed with a COVID booster today so this was given.  Other vaccinations are up-to-date.       Acquired hypothyroidism  Check thyroid labs  Orders:    CBC and differential; Future    TSH, 3rd generation with Free T4 reflex; Future    Mixed hyperlipidemia  Check lipids  Orders:    CBC and differential; Future    Comprehensive metabolic panel; Future    LDL cholesterol, direct; Future    Nonrheumatic mitral valve regurgitation  She has a history of \"mitral valve prolapse\".  She has no murmur on exam today.  She has not had an echo in many years and declines that today.       Screening for colorectal cancer  I discussed the importance of colorectal cancer screening.  She is only willing to consider a fit test at this time so I have ordered that for her.  Orders:    Occult Blood, Fecal Immunochemical (FIT); Future    Encounter for screening mammogram for malignant neoplasm of breast    Orders:    Mammo screening bilateral w 3d & cad; Future    Anemia due to vitamin B12 deficiency, unspecified B12 deficiency type  Check B12  Orders:    Vitamin B12; Future    Need for COVID-19 vaccine    Orders:    COVID-19 Pfizer mRNA vaccine 12 yr and older (GRAY cap vial or pre-filled syringe)    Adolescent idiopathic scoliosis of thoracic region  She has a history of remote surgery at age 18.  She has some chronic back pain but this is tolerable at this time.                Depression Screening and Follow-up Plan: Patient was screened for depression during today's encounter. " They screened negative with a PHQ-2 score of 0.      Preventive health issues were discussed with patient, and age appropriate screening tests were ordered as noted in patient's After Visit Summary. Personalized health advice and appropriate referrals for health education or preventive services given if needed, as noted in patient's After Visit Summary.    History of Present Illness     HPI patient presents today for follow-up of chronic health issues as well as wellness visit.  She has no acute complaints.  She denies any positive chest pain, shortness of breath or palpitations.  She has a history of questionable mitral valve regurgitation and denies any lightheadedness or exertional shortness of breath.  Patient Care Team:  James Cobb MD as PCP - General (Family Medicine)    Review of Systems   Constitutional:  Negative for appetite change, chills, fatigue, fever and unexpected weight change.   HENT:  Negative for trouble swallowing.    Eyes:  Negative for visual disturbance.   Respiratory:  Negative for cough, chest tightness, shortness of breath and wheezing.    Cardiovascular:  Negative for chest pain, palpitations and leg swelling.   Gastrointestinal:  Negative for abdominal distention, abdominal pain, blood in stool, constipation and diarrhea.   Endocrine: Negative for polyuria.   Genitourinary:  Negative for difficulty urinating and flank pain.   Musculoskeletal:  Negative for arthralgias and myalgias.   Skin:  Negative for rash.   Neurological:  Negative for dizziness and light-headedness.   Hematological:  Negative for adenopathy. Does not bruise/bleed easily.   Psychiatric/Behavioral:  Negative for dysphoric mood and sleep disturbance. The patient is not nervous/anxious.      Medical History Reviewed by provider this encounter:       Annual Wellness Visit Questionnaire       Health Risk Assessment:   Patient rates overall health as very good. Patient feels that their physical health rating  is same. Patient is satisfied with their life. Eyesight was rated as same. Hearing was rated as same. Patient feels that their emotional and mental health rating is same. Patients states they are never, rarely angry. Patient states they are sometimes unusually tired/fatigued. Pain experienced in the last 7 days has been none. Patient states that she has experienced no weight loss or gain in last 6 months.     Depression Screening:   PHQ-2 Score: 0      Fall Risk Screening:   In the past year, patient has experienced: no history of falling in past year      Urinary Incontinence Screening:   Patient has not leaked urine accidently in the last six months.     Home Safety:  Patient does not have trouble with stairs inside or outside of their home. Patient has working smoke alarms and has working carbon monoxide detector. Home safety hazards include: none.     Nutrition:   Current diet is Regular.     Medications:   Patient is currently taking over-the-counter supplements. OTC medications include: see medication list. Patient is able to manage medications.     Activities of Daily Living (ADLs)/Instrumental Activities of Daily Living (IADLs):   Walk and transfer into and out of bed and chair?: Yes  Dress and groom yourself?: Yes    Bathe or shower yourself?: Yes    Feed yourself? Yes  Do your laundry/housekeeping?: Yes  Manage your money, pay your bills and track your expenses?: Yes  Make your own meals?: Yes    Do your own shopping?: Yes    Previous Hospitalizations:   Any hospitalizations or ED visits within the last 12 months?: Yes    How many hospitalizations have you had in the last year?: 1-2    Hospitalization Comments: kidney stones    Advance Care Planning:   Living will: Yes    Durable POA for healthcare: Yes    Advanced directive: Yes    End of Life Decisions reviewed with patient: Yes      Cognitive Screening:   Provider or family/friend/caregiver concerned regarding cognition?: No    PREVENTIVE SCREENINGS       Cardiovascular Screening:    General: Screening Not Indicated and History Lipid Disorder      Diabetes Screening:     General: Screening Current      Cervical Cancer Screening:    General: Screening Not Indicated      Lung Cancer Screening:     General: Screening Not Indicated      Hepatitis C Screening:    General: Screening Current    Screening, Brief Intervention, and Referral to Treatment (SBIRT)    Screening  Typical number of drinks in a day: 0  Typical number of drinks in a week: 0  Interpretation: Low risk drinking behavior.    AUDIT-C Screenin) How often did you have a drink containing alcohol in the past year? never  2) How many drinks did you have on a typical day when you were drinking in the past year? 0  3) How often did you have 6 or more drinks on one occasion in the past year? never    AUDIT-C Score: 0  Interpretation: Score 0-2 (female): Negative screen for alcohol misuse    Single Item Drug Screening:  How often have you used an illegal drug (including marijuana) or a prescription medication for non-medical reasons in the past year? never    Single Item Drug Screen Score: 0  Interpretation: Negative screen for possible drug use disorder    Social Determinants of Health     Financial Resource Strain: Low Risk  (2023)    Overall Financial Resource Strain (CARDIA)     Difficulty of Paying Living Expenses: Not hard at all   Food Insecurity: No Food Insecurity (7/10/2024)    Hunger Vital Sign     Worried About Running Out of Food in the Last Year: Never true     Ran Out of Food in the Last Year: Never true   Transportation Needs: No Transportation Needs (7/10/2024)    PRAPARE - Transportation     Lack of Transportation (Medical): No     Lack of Transportation (Non-Medical): No   Housing Stability: Low Risk  (7/10/2024)    Housing Stability Vital Sign     Unable to Pay for Housing in the Last Year: No     Number of Times Moved in the Last Year: 1     Homeless in the Last Year: No   Utilities:  "Not At Risk (7/10/2024)    [facility] Utilities     Threatened with loss of utilities: No     No results found.    Objective     /72 (BP Location: Left arm, Patient Position: Sitting, Cuff Size: Standard)   Pulse 74   Temp 99.4 °F (37.4 °C) (Skin)   Resp 20   Ht 5' 6\" (1.676 m)   Wt 54.4 kg (120 lb)   SpO2 95%   BMI 19.37 kg/m²     Physical Exam  Constitutional:       General: She is not in acute distress.     Appearance: She is well-developed. She is not diaphoretic.   HENT:      Head: Normocephalic.      Right Ear: External ear normal.      Left Ear: External ear normal.      Nose: Nose normal.   Eyes:      General: No scleral icterus.        Right eye: No discharge.         Left eye: No discharge.      Conjunctiva/sclera: Conjunctivae normal.      Pupils: Pupils are equal, round, and reactive to light.   Neck:      Thyroid: No thyromegaly.      Trachea: No tracheal deviation.   Cardiovascular:      Rate and Rhythm: Normal rate and regular rhythm.      Heart sounds: Normal heart sounds. No murmur heard.  Pulmonary:      Effort: Pulmonary effort is normal. No respiratory distress.      Breath sounds: Normal breath sounds. No stridor. No wheezing, rhonchi or rales.   Abdominal:      General: Bowel sounds are normal. There is no distension.      Palpations: Abdomen is soft.      Tenderness: There is no abdominal tenderness. There is no guarding.   Musculoskeletal:         General: No tenderness or deformity. Normal range of motion.      Right lower leg: No edema.      Left lower leg: No edema.   Lymphadenopathy:      Cervical: No cervical adenopathy.   Skin:     General: Skin is warm.      Coloration: Skin is not pale.      Findings: No erythema or rash.   Neurological:      Cranial Nerves: No cranial nerve deficit.      Coordination: Coordination normal.   Psychiatric:         Mood and Affect: Mood normal.         Behavior: Behavior normal.         Thought Content: Thought content normal.           "

## 2024-08-22 DIAGNOSIS — E03.9 ACQUIRED HYPOTHYROIDISM: ICD-10-CM

## 2024-08-22 RX ORDER — LEVOTHYROXINE SODIUM 75 UG/1
75 TABLET ORAL
Qty: 90 TABLET | Refills: 1 | Status: SHIPPED | OUTPATIENT
Start: 2024-08-22

## 2024-11-15 DIAGNOSIS — E78.2 MIXED HYPERLIPIDEMIA: ICD-10-CM

## 2024-11-15 RX ORDER — ATORVASTATIN CALCIUM 10 MG/1
10 TABLET, FILM COATED ORAL DAILY
Qty: 30 TABLET | Refills: 0 | Status: SHIPPED | OUTPATIENT
Start: 2024-11-15 | End: 2024-11-17 | Stop reason: SDUPTHER

## 2024-11-15 NOTE — TELEPHONE ENCOUNTER
Patient needs updated blood work. Please place orders. A courtesy refill was provided.      Lipid Panel

## 2024-11-17 RX ORDER — ATORVASTATIN CALCIUM 10 MG/1
10 TABLET, FILM COATED ORAL DAILY
Qty: 90 TABLET | Refills: 1 | Status: SHIPPED | OUTPATIENT
Start: 2024-11-17

## 2025-01-02 ENCOUNTER — APPOINTMENT (OUTPATIENT)
Dept: LAB | Facility: MEDICAL CENTER | Age: 70
End: 2025-01-02
Payer: MEDICARE

## 2025-01-02 LAB — LDLC SERPL DIRECT ASSAY-MCNC: 91 MG/DL (ref 0–100)

## 2025-01-10 ENCOUNTER — OFFICE VISIT (OUTPATIENT)
Dept: FAMILY MEDICINE CLINIC | Facility: CLINIC | Age: 70
End: 2025-01-10
Payer: MEDICARE

## 2025-01-10 VITALS
WEIGHT: 122.8 LBS | OXYGEN SATURATION: 98 % | TEMPERATURE: 98 F | HEIGHT: 66 IN | BODY MASS INDEX: 19.73 KG/M2 | SYSTOLIC BLOOD PRESSURE: 136 MMHG | DIASTOLIC BLOOD PRESSURE: 72 MMHG | HEART RATE: 82 BPM | RESPIRATION RATE: 16 BRPM

## 2025-01-10 DIAGNOSIS — Z23 NEED FOR COVID-19 VACCINE: ICD-10-CM

## 2025-01-10 DIAGNOSIS — D51.9 ANEMIA DUE TO VITAMIN B12 DEFICIENCY, UNSPECIFIED B12 DEFICIENCY TYPE: ICD-10-CM

## 2025-01-10 DIAGNOSIS — E03.9 ACQUIRED HYPOTHYROIDISM: ICD-10-CM

## 2025-01-10 DIAGNOSIS — Z78.0 POST-MENOPAUSAL: ICD-10-CM

## 2025-01-10 DIAGNOSIS — E78.2 MIXED HYPERLIPIDEMIA: Primary | ICD-10-CM

## 2025-01-10 DIAGNOSIS — Z12.31 ENCOUNTER FOR SCREENING MAMMOGRAM FOR BREAST CANCER: ICD-10-CM

## 2025-01-10 DIAGNOSIS — Z82.49 FAMILY HISTORY OF CORONARY ARTERY DISEASE: ICD-10-CM

## 2025-01-10 DIAGNOSIS — F32.9 REACTIVE DEPRESSION: ICD-10-CM

## 2025-01-10 DIAGNOSIS — F51.01 PRIMARY INSOMNIA: ICD-10-CM

## 2025-01-10 DIAGNOSIS — I34.0 NONRHEUMATIC MITRAL VALVE REGURGITATION: ICD-10-CM

## 2025-01-10 PROCEDURE — 91320 SARSCV2 VAC 30MCG TRS-SUC IM: CPT

## 2025-01-10 PROCEDURE — 99214 OFFICE O/P EST MOD 30 MIN: CPT | Performed by: FAMILY MEDICINE

## 2025-01-10 PROCEDURE — 90480 ADMN SARSCOV2 VAC 1/ONLY CMP: CPT

## 2025-01-10 NOTE — ASSESSMENT & PLAN NOTE
She is concerned regarding a family history of coronary artery disease.  Check coronary calcium score and continue work on risk factor modification.  Orders:    CT coronary calcium score; Future

## 2025-01-10 NOTE — PROGRESS NOTES
Name: Colt Palafox      : 1955      MRN: [id number]  Encounter Provider: Damien Gibbs Jr, MD  Encounter Date: 1/10/2025   Encounter department: [facility] PRIMARY CARE  :  Assessment & Plan  Mixed hyperlipidemia  Check labs as outlined.  She remains on atorvastatin at 10 mg.  Orders:    Comprehensive metabolic panel; Future    Lipid Panel with Direct LDL reflex; Future    Comprehensive metabolic panel; Future    Acquired hypothyroidism  Monitor thyroid labs.  Orders:    TSH, 3rd generation with Free T4 reflex; Future    TSH, 3rd generation with Free T4 reflex; Future    Nonrheumatic mitral valve regurgitation  She remains clinically stable.  Orders:    Comprehensive metabolic panel; Future    Primary insomnia  She has had some significant insomnia since the loss of her mother day after [place].  I offered some lorazepam but she would prefer to hold off on medication at this time.         Anemia due to vitamin B12 deficiency, unspecified B12 deficiency type  Check labs as outlined.  Orders:    CBC and differential; Future    Vitamin B12; Future    CBC and differential; Future    Comprehensive metabolic panel; Future    Post-menopausal    Orders:    DXA bone density spine hip and pelvis; Future    Encounter for screening mammogram for breast cancer    Orders:    Mammo screening bilateral w 3d and cad; Future    Need for COVID-19 vaccine    Orders:    COVID-19 Pfizer mRNA vaccine 12 yr and older (Comirnaty pre-filled syringe)    Family history of coronary artery disease  She is concerned regarding a family history of coronary artery disease.  Check coronary calcium score and continue work on risk factor modification.  Orders:    CT coronary calcium score; Future    Reactive depression  She had a very close relationship with her mother and is devastated over her recent loss.  She does not feel she needs medication at this time but will keep in touch if her mood is getting any  "worse.                History of Present Illness     HPI patient presents here for follow-up of chronic health issues.  She is devastated by the loss of her mother a few weeks ago.  She has been very tearful since that time.  Her and her mother were very close and live together and often even slept in the same bed.  She denies any thoughts self-harm.  She has history of hypothyroidism denies any excessive fatigue, constipation or dry skin.  She tolerates atorvastatin for hyperlipidemia.  Review of Systems   Constitutional:  Negative for appetite change, chills, fatigue, fever and unexpected weight change.   HENT:  Negative for trouble swallowing.    Eyes:  Negative for visual disturbance.   Respiratory:  Negative for cough, chest tightness, shortness of breath and wheezing.    Cardiovascular:  Negative for chest pain, palpitations and leg swelling.   Gastrointestinal:  Negative for abdominal distention, abdominal pain, blood in stool, constipation and diarrhea.   Endocrine: Negative for polyuria.   Genitourinary:  Negative for difficulty urinating and flank pain.   Musculoskeletal:  Negative for arthralgias and myalgias.   Skin:  Negative for rash.   Neurological:  Negative for dizziness and light-headedness.   Hematological:  Negative for adenopathy. Does not bruise/bleed easily.   Psychiatric/Behavioral:  Positive for sleep disturbance. Negative for dysphoric mood and suicidal ideas. The patient is nervous/anxious.        Objective   /72 (BP Location: Left arm, Patient Position: Sitting, Cuff Size: Standard)   Pulse 82   Temp 98 °F (36.7 °C) (Temporal)   Resp 16   Ht 5' 6\" (1.676 m)   Wt 55.7 kg (122 lb 12.8 oz)   SpO2 98%   BMI 19.82 kg/m²      Physical Exam  Constitutional:       General: She is not in acute distress.     Appearance: She is well-developed. She is not diaphoretic.   HENT:      Head: Normocephalic.      Right Ear: External ear normal.      Left Ear: External ear normal.      Nose: " Nose normal.   Eyes:      General: No scleral icterus.        Right eye: No discharge.         Left eye: No discharge.      Conjunctiva/sclera: Conjunctivae normal.      Pupils: Pupils are equal, round, and reactive to light.   Neck:      Thyroid: No thyromegaly.      Trachea: No tracheal deviation.   Cardiovascular:      Rate and Rhythm: Normal rate and regular rhythm.      Heart sounds: Normal heart sounds. No murmur heard.  Pulmonary:      Effort: Pulmonary effort is normal. No respiratory distress.      Breath sounds: Normal breath sounds. No stridor. No wheezing, rhonchi or rales.   Abdominal:      General: Bowel sounds are normal. There is no distension.      Palpations: Abdomen is soft.      Tenderness: There is no abdominal tenderness. There is no guarding.   Musculoskeletal:         General: No tenderness or deformity. Normal range of motion.      Right lower leg: No edema.      Left lower leg: No edema.   Lymphadenopathy:      Cervical: No cervical adenopathy.   Skin:     General: Skin is warm.      Coloration: Skin is not pale.      Findings: No erythema or rash.   Neurological:      Cranial Nerves: No cranial nerve deficit.      Coordination: Coordination normal.   Psychiatric:         Mood and Affect: Mood normal.         Behavior: Behavior normal.         Thought Content: Thought content normal.

## 2025-01-10 NOTE — ASSESSMENT & PLAN NOTE
Check labs as outlined.  She remains on atorvastatin at 10 mg.  Orders:    Comprehensive metabolic panel; Future    Lipid Panel with Direct LDL reflex; Future    Comprehensive metabolic panel; Future

## 2025-01-10 NOTE — ASSESSMENT & PLAN NOTE
She has had some significant insomnia since the loss of her mother day after [place].  I offered some lorazepam but she would prefer to hold off on medication at this time.

## 2025-01-10 NOTE — ASSESSMENT & PLAN NOTE
Check labs as outlined.  Orders:    CBC and differential; Future    Vitamin B12; Future    CBC and differential; Future    Comprehensive metabolic panel; Future

## 2025-01-10 NOTE — ASSESSMENT & PLAN NOTE
Monitor thyroid labs.  Orders:    TSH, 3rd generation with Free T4 reflex; Future    TSH, 3rd generation with Free T4 reflex; Future

## 2025-01-13 ENCOUNTER — APPOINTMENT (OUTPATIENT)
Dept: LAB | Facility: HOSPITAL | Age: 70
End: 2025-01-13
Payer: MEDICARE

## 2025-01-13 ENCOUNTER — RESULTS FOLLOW-UP (OUTPATIENT)
Dept: FAMILY MEDICINE CLINIC | Facility: CLINIC | Age: 70
End: 2025-01-13

## 2025-01-13 DIAGNOSIS — Z12.11 SPECIAL SCREENING FOR MALIGNANT NEOPLASMS, COLON: ICD-10-CM

## 2025-01-13 PROBLEM — F32.9 REACTIVE DEPRESSION: Status: ACTIVE | Noted: 2025-01-13

## 2025-01-13 LAB — HEMOCCULT STL QL IA: NEGATIVE

## 2025-01-13 PROCEDURE — G0328 FECAL BLOOD SCRN IMMUNOASSAY: HCPCS

## 2025-01-13 NOTE — ASSESSMENT & PLAN NOTE
She had a very close relationship with her mother and is devastated over her recent loss.  She does not feel she needs medication at this time but will keep in touch if her mood is getting any worse.

## 2025-02-05 ENCOUNTER — OFFICE VISIT (OUTPATIENT)
Dept: FAMILY MEDICINE CLINIC | Facility: CLINIC | Age: 70
End: 2025-02-05
Payer: MEDICARE

## 2025-02-05 VITALS
BODY MASS INDEX: 19.64 KG/M2 | WEIGHT: 122.2 LBS | TEMPERATURE: 97.2 F | DIASTOLIC BLOOD PRESSURE: 78 MMHG | HEIGHT: 66 IN | OXYGEN SATURATION: 99 % | RESPIRATION RATE: 20 BRPM | SYSTOLIC BLOOD PRESSURE: 132 MMHG | HEART RATE: 69 BPM

## 2025-02-05 DIAGNOSIS — H61.22 IMPACTED CERUMEN OF LEFT EAR: ICD-10-CM

## 2025-02-05 DIAGNOSIS — H60.502 ACUTE OTITIS EXTERNA OF LEFT EAR, UNSPECIFIED TYPE: ICD-10-CM

## 2025-02-05 DIAGNOSIS — H69.92 DYSFUNCTION OF LEFT EUSTACHIAN TUBE: Primary | ICD-10-CM

## 2025-02-05 PROCEDURE — 99213 OFFICE O/P EST LOW 20 MIN: CPT | Performed by: FAMILY MEDICINE

## 2025-02-05 PROCEDURE — G2211 COMPLEX E/M VISIT ADD ON: HCPCS | Performed by: FAMILY MEDICINE

## 2025-02-05 PROCEDURE — 69210 REMOVE IMPACTED EAR WAX UNI: CPT | Performed by: FAMILY MEDICINE

## 2025-02-05 RX ORDER — METHYLPREDNISOLONE 4 MG/1
TABLET ORAL
Qty: 21 EACH | Refills: 0 | Status: SHIPPED | OUTPATIENT
Start: 2025-02-05

## 2025-02-05 RX ORDER — ACETIC ACID 20.65 MG/ML
4 SOLUTION AURICULAR (OTIC) 3 TIMES DAILY
Qty: 15 ML | Refills: 0 | Status: SHIPPED | OUTPATIENT
Start: 2025-02-05 | End: 2025-02-12

## 2025-02-05 NOTE — PROGRESS NOTES
Name: Colt Palafox      : 1955      MRN: [id number]  Encounter Provider: James Cobb MD  Encounter Date: 2025   Encounter department: [facility] PRIMARY CARE  :  Assessment & Plan  Dysfunction of left eustachian tube    Has had off an on in the past, reports flonase not effective, will start medrol dose pack    Orders:    methylPREDNISolone 4 MG tablet therapy pack; Use as directed on package    Impacted cerumen of left ear    Removed with currette patient tolerated well       Acute otitis externa of left ear, unspecified type    Start treatment with vosol follow up or see ENT if not resolved.  Orders:    acetic acid (VOSOL) 2 % otic solution; Administer 4 drops into the left ear 3 (three) times a day for 7 days    Ear cerumen removal    Date/Time: 2025 1:00 PM    Performed by: James Cobb MD  Authorized by: James Cobb MD  Universal Protocol:  procedure performed by consultantConsent: Verbal consent obtained. Written consent not obtained.  Timeout called at: 2025 12:00 PM.    Patient location:  Bedside  Procedure details:     Location:  L ear    Procedure type: curette      Approach:  External  Post-procedure details:     Complication:  None    Hearing quality:  Normal    Patient tolerance of procedure:  Tolerated well, no immediate complications           History of Present Illness   HPI    69 year old fmeal presenting for feeling of left ear clogged for last 3 days.    Has had off and on in the past.    Having sinus pressure as well, reports having this in the past and being told it was eustachian tube dysfunction.    Review of Systems   Constitutional:  Negative for activity change and appetite change.   Respiratory:  Negative for apnea, chest tightness and shortness of breath.    Cardiovascular:  Negative for chest pain and palpitations.   Gastrointestinal:  Negative for abdominal distention and abdominal pain.   Musculoskeletal:   "Negative for arthralgias.       Objective   /78 (BP Location: Right arm, Patient Position: Sitting, Cuff Size: Standard)   Pulse 69   Temp (!) 97.2 °F (36.2 °C) (Tympanic)   Resp 20   Ht 5' 6\" (1.676 m)   Wt 55.4 kg (122 lb 3.2 oz)   SpO2 99%   BMI 19.72 kg/m²      Physical Exam  Constitutional:       Appearance: Normal appearance.   HENT:      Right Ear: Hearing and tympanic membrane normal.      Left Ear: Hearing and tympanic membrane normal. Swelling present. There is impacted cerumen.      Nose:      Right Sinus: Maxillary sinus tenderness present. No frontal sinus tenderness.      Left Sinus: Maxillary sinus tenderness present. No frontal sinus tenderness.   Cardiovascular:      Rate and Rhythm: Normal rate and regular rhythm.   Neurological:      Mental Status: She is alert.         "

## 2025-02-15 DIAGNOSIS — E78.2 MIXED HYPERLIPIDEMIA: ICD-10-CM

## 2025-02-17 RX ORDER — ATORVASTATIN CALCIUM 10 MG/1
10 TABLET, FILM COATED ORAL DAILY
Qty: 90 TABLET | Refills: 1 | Status: SHIPPED | OUTPATIENT
Start: 2025-02-17

## 2025-03-07 ENCOUNTER — TELEPHONE (OUTPATIENT)
Age: 70
End: 2025-03-07

## 2025-03-07 NOTE — TELEPHONE ENCOUNTER
Patient would like to start an exercise class at [facility] but needs a form completed by her Dr mcgarry. She is going to drop it off today.

## 2025-03-19 ENCOUNTER — HOSPITAL ENCOUNTER (OUTPATIENT)
Dept: CT IMAGING | Facility: HOSPITAL | Age: 70
Discharge: HOME | End: 2025-03-19
Payer: COMMERCIAL

## 2025-03-19 DIAGNOSIS — Z82.49 FAMILY HISTORY OF CORONARY ARTERY DISEASE: ICD-10-CM

## 2025-03-19 PROCEDURE — 75571 CT HRT W/O DYE W/CA TEST: CPT

## 2025-03-28 ENCOUNTER — RESULTS FOLLOW-UP (OUTPATIENT)
Dept: FAMILY MEDICINE CLINIC | Facility: CLINIC | Age: 70
End: 2025-03-28

## 2025-05-02 ENCOUNTER — OFFICE VISIT (OUTPATIENT)
Dept: CARDIOLOGY CLINIC | Facility: CLINIC | Age: 70
End: 2025-05-02
Payer: MEDICARE

## 2025-05-02 VITALS
DIASTOLIC BLOOD PRESSURE: 78 MMHG | TEMPERATURE: 97.5 F | BODY MASS INDEX: 20.76 KG/M2 | WEIGHT: 129.2 LBS | HEART RATE: 83 BPM | HEIGHT: 66 IN | OXYGEN SATURATION: 96 % | SYSTOLIC BLOOD PRESSURE: 134 MMHG

## 2025-05-02 DIAGNOSIS — R01.1 CARDIAC MURMUR: ICD-10-CM

## 2025-05-02 DIAGNOSIS — Z82.49 FAMILY HISTORY OF CORONARY ARTERY DISEASE: ICD-10-CM

## 2025-05-02 DIAGNOSIS — I34.0 NONRHEUMATIC MITRAL VALVE REGURGITATION: Primary | ICD-10-CM

## 2025-05-02 DIAGNOSIS — E78.2 MIXED HYPERLIPIDEMIA: ICD-10-CM

## 2025-05-02 PROCEDURE — 99204 OFFICE O/P NEW MOD 45 MIN: CPT | Performed by: STUDENT IN AN ORGANIZED HEALTH CARE EDUCATION/TRAINING PROGRAM

## 2025-05-02 PROCEDURE — 93000 ELECTROCARDIOGRAM COMPLETE: CPT | Performed by: STUDENT IN AN ORGANIZED HEALTH CARE EDUCATION/TRAINING PROGRAM

## 2025-05-02 NOTE — PROGRESS NOTES
Cardiology Consultation     Colt Palafox  [id number]  1955  [facility]  [address] [place] [id number]      1. Nonrheumatic mitral valve regurgitation  POCT ECG    Echo complete w/ contrast if indicated      2. Cardiac murmur  Echo complete w/ contrast if indicated      3. Mixed hyperlipidemia        4. Family history of coronary artery disease  POCT ECG          Discussion/Summary:  Mitral regurgitation  - Patient reports having history of mitral regurgitation on echocardiogram many years ago, unable to see report  - Does have a mild systolic murmur on exam  - Will check TTE  Hyperlipidemia  - Direct LDL 1/2/2025 was 91  - Continue atorvastatin 10 mg daily  - Coronary artery calcium score 3/19/2025 showed a total score of 1 in the LAD territory, Schafer trial 48th percentile  Hypothyroidism    Follow-up in 3 to 4 months.    History of Present Illness:  Colt Palafox is a 69 y.o. year old female with a past medical history of hyperlipidemia, hypothyroidism and family history of CAD.  She reports feeling okay today.  Her mother who is also my patient passed away in January 2025.  She was very close with her and is still grieving from her loss.  She is also concerned about any cardiac problems given her family history.  Denies any exertional chest pain, shortness of breath, lower extremity edema, or other concerning cardiac symptoms at this time.  Does acknowledge some occasional chest discomfort at rest when she is emotional.  Also reports being told she had a heart murmur and mitral regurgitation in the past.  Patient reports it was mild at the time, but no record of the echocardiogram is available.      Patient Active Problem List   Diagnosis    Adolescent idiopathic scoliosis    Acquired hypothyroidism    Hyperlipidemia    Vitamin B12 deficiency anemia    Nonrheumatic mitral valve regurgitation    Primary insomnia    Family history of coronary artery disease    Reactive  depression     Past Medical History:   Diagnosis Date    Heart murmur about 2010    mitral valve regurgitation (mild)    Hyperlipidemia     Hypothyroid     Nephrolithiasis 01/24/2024    Nosebleed 1960    only as a child    Scoliosis scoliosis - spinal fusion 1973     Social History     Socioeconomic History    Marital status: Single     Spouse name: Not on file    Number of children: Not on file    Years of education: Not on file    Highest education level: Not on file   Occupational History    Not on file   Tobacco Use    Smoking status: Never    Smokeless tobacco: Never   Vaping Use    Vaping status: Never Used   Substance and Sexual Activity    Alcohol use: Not Currently    Drug use: Never    Sexual activity: Never   Other Topics Concern    Not on file   Social History Narrative    Does not consume caffeine     Social Drivers of Health     Financial Resource Strain: Low Risk  (7/18/2023)    Overall Financial Resource Strain (CARDIA)     Difficulty of Paying Living Expenses: Not hard at all   Food Insecurity: No Food Insecurity (7/10/2024)    Nursing - Inadequate Food Risk Classification     Worried About Running Out of Food in the Last Year: Never true     Ran Out of Food in the Last Year: Never true     Ran Out of Food in the Last Year: Not on file   Transportation Needs: No Transportation Needs (7/10/2024)    PRAPARE - Transportation     Lack of Transportation (Medical): No     Lack of Transportation (Non-Medical): No   Physical Activity: Not on file   Stress: Not on file   Social Connections: Not on file   Intimate Partner Violence: Not on file   Housing Stability: Low Risk  (7/10/2024)    Housing Stability Vital Sign     Unable to Pay for Housing in the Last Year: No     Number of Times Moved in the Last Year: 1     Homeless in the Last Year: No      Family History   Problem Relation Age of Onset    Thyroid disease Mother     Coronary artery disease Mother     Anxiety disorder Mother     Heart disease Father      Stroke Father      Past Surgical History:   Procedure Laterality Date    SPINE SURGERY      scoliosis repair with kelvin in spine - age 18       Current Outpatient Medications:     atorvastatin (LIPITOR) 10 mg tablet, TAKE 1 TABLET BY MOUTH EVERY DAY, Disp: 90 tablet, Rfl: 1    cholecalciferol (VITAMIN D3) 400 units tablet, Take 400 Units by mouth daily, Disp: , Rfl:     cyanocobalamin (VITAMIN B-12) 100 mcg tablet, Take by mouth daily, Disp: , Rfl:     levothyroxine 75 mcg tablet, TAKE 1 TABLET (75 MCG TOTAL) BY MOUTH DAILY IN THE EARLY MORNING, Disp: 90 tablet, Rfl: 1  Allergies   Allergen Reactions    Grapefruit Extract - Food Allergy Hives and Swelling    Ciprofloxacin      Temporary hearing loss    Penicillins Rash         Labs:  Lab Results   Component Value Date    ALT 11 07/10/2024    AST 15 07/10/2024    BUN 14 07/10/2024    CALCIUM 9.1 07/10/2024     07/10/2024    CO2 29 07/10/2024    CREATININE 0.54 (L) 07/10/2024    HDL 61 2022    HCT 44.6 07/10/2024    HGB 14.6 07/10/2024     07/10/2024    K 3.7 07/10/2024    TRIG 85 2022    WBC 6.54 07/10/2024       Imaging: No results found.    EC2025: Sinus rhythm with first-degree AV block, rightward axis, borderline ECG    Review of Systems:  Review of Systems   Constitutional:  Positive for fatigue. Negative for chills, diaphoresis and fever.   HENT:  Negative for congestion.    Eyes:  Negative for photophobia and visual disturbance.   Respiratory:  Negative for chest tightness and shortness of breath.    Cardiovascular:  Negative for chest pain, palpitations and leg swelling.   Gastrointestinal:  Negative for abdominal distention, abdominal pain, diarrhea, nausea and vomiting.   Genitourinary:  Negative for difficulty urinating and dysuria.   Musculoskeletal:  Negative for arthralgias, gait problem and joint swelling.   Skin:  Negative for color change, pallor and rash.   Neurological:  Negative for dizziness, syncope, numbness and  "headaches.   Psychiatric/Behavioral:  Positive for dysphoric mood. Negative for agitation, behavioral problems and confusion.          Vitals:    05/02/25 1052   BP: 134/78   Pulse: 83   Temp: 97.5 °F (36.4 °C)   SpO2: 96%      Vitals:    05/02/25 1052   Weight: 58.6 kg (129 lb 3.2 oz)     Height: 5' 6\" (167.6 cm)     Physical Exam:  General appearance:  Appears stated age, alert, well appearing and in no distress  HEENT:  PERRLA, EOMI, no scleral icterus, no conjunctival pallor  NECK:  Supple, No elevated JVP, no thyromegaly, no carotid bruits  HEART:  Regular rate and rhythm, normal S1/S2, no S3/S4, 2/6 systolic murmur  LUNGS:  Clear to auscultation bilaterally  ABDOMEN:  Soft, non-tender, positive bowel sounds, no rebound or guarding, no organomegaly   EXTREMITIES:  No edema  VASCULAR:  Normal pedal pulses   SKIN: No lesions or rashes on exposed skin  NEURO:  CN II-XII intact, no focal deficits   "

## 2025-05-12 DIAGNOSIS — E03.9 ACQUIRED HYPOTHYROIDISM: ICD-10-CM

## 2025-05-12 RX ORDER — LEVOTHYROXINE SODIUM 75 UG/1
75 TABLET ORAL
Qty: 90 TABLET | Refills: 1 | Status: SHIPPED | OUTPATIENT
Start: 2025-05-12

## 2025-05-28 ENCOUNTER — OFFICE VISIT (OUTPATIENT)
Dept: FAMILY MEDICINE CLINIC | Facility: CLINIC | Age: 70
End: 2025-05-28
Payer: MEDICARE

## 2025-05-28 VITALS
WEIGHT: 131 LBS | SYSTOLIC BLOOD PRESSURE: 120 MMHG | BODY MASS INDEX: 21.14 KG/M2 | OXYGEN SATURATION: 96 % | TEMPERATURE: 96.7 F | DIASTOLIC BLOOD PRESSURE: 64 MMHG | HEART RATE: 80 BPM

## 2025-05-28 DIAGNOSIS — E03.9 ACQUIRED HYPOTHYROIDISM: ICD-10-CM

## 2025-05-28 DIAGNOSIS — E78.2 MIXED HYPERLIPIDEMIA: ICD-10-CM

## 2025-05-28 DIAGNOSIS — D51.9 ANEMIA DUE TO VITAMIN B12 DEFICIENCY, UNSPECIFIED B12 DEFICIENCY TYPE: ICD-10-CM

## 2025-05-28 DIAGNOSIS — Z00.00 MEDICARE ANNUAL WELLNESS VISIT, SUBSEQUENT: Primary | ICD-10-CM

## 2025-05-28 PROCEDURE — G0439 PPPS, SUBSEQ VISIT: HCPCS | Performed by: FAMILY MEDICINE

## 2025-05-28 PROCEDURE — 99214 OFFICE O/P EST MOD 30 MIN: CPT | Performed by: FAMILY MEDICINE

## 2025-05-28 PROCEDURE — G2211 COMPLEX E/M VISIT ADD ON: HCPCS | Performed by: FAMILY MEDICINE

## 2025-05-28 NOTE — PROGRESS NOTES
Name: Colt Palafox      : 1955      MRN: [id number]  Encounter Provider: James Cobb MD  Encounter Date: 2025   Encounter department: [facility] PRIMARY CARE  :  Assessment & Plan  Medicare annual wellness visit, subsequent         Acquired hypothyroidism    Remains on levothyroixine check TSH       Anemia due to vitamin B12 deficiency, unspecified B12 deficiency type      Continue to monitor CBC         Mixed hyperlipidemia    Monitor LDL          Preventive health issues were discussed with patient, and age appropriate screening tests were ordered as noted in patient's After Visit Summary. Personalized health advice and appropriate referrals for health education or preventive services given if needed, as noted in patient's After Visit Summary.    History of Present Illness     HPI     69 year old presenting for medicare annual wellness and routine follow up.    Has pending CT due to hearing loss and seeing dentist regarding TMJ.    Has pending lab and blood work from previous viist.    Seen by cardiology and has upcmoning echo    Started exercise program with a  that is going well.    Patient Care Team:  James Cobb MD as PCP - General (Family Medicine)  Jalil Yoon MD (Internal Medicine)    Review of Systems   Constitutional:  Negative for appetite change.   Respiratory:  Negative for apnea and chest tightness.    Cardiovascular:  Negative for chest pain and palpitations.   Gastrointestinal:  Negative for abdominal distention and abdominal pain.   Musculoskeletal:  Negative for arthralgias.     Medical History Reviewed by provider this encounter:  Meds  Problems       Annual Wellness Visit Questionnaire   Colt is here for her Subsequent Wellness visit.     Health Risk Assessment:   Patient rates overall health as very good. Patient feels that their physical health rating is same. Patient is satisfied with their life. Eyesight  was rated as same. Hearing was rated as slightly worse. Patient feels that their emotional and mental health rating is same. Patients states they are never, rarely angry. Patient states they are sometimes unusually tired/fatigued. Pain experienced in the last 7 days has been none. Patient states that she has experienced no weight loss or gain in last 6 months.     Fall Risk Screening:   In the past year, patient has experienced: no history of falling in past year      Urinary Incontinence Screening:   Patient has not leaked urine accidently in the last six months.     Home Safety:  Patient does not have trouble with stairs inside or outside of their home. Patient has working smoke alarms and has working carbon monoxide detector. Home safety hazards include: none.     Nutrition:   Current diet is Regular.     Medications:   Patient is currently taking over-the-counter supplements. OTC medications include: see medication list. Patient is able to manage medications.     Activities of Daily Living (ADLs)/Instrumental Activities of Daily Living (IADLs):   Walk and transfer into and out of bed and chair?: Yes  Dress and groom yourself?: Yes    Bathe or shower yourself?: Yes    Feed yourself? Yes  Do your laundry/housekeeping?: Yes  Manage your money, pay your bills and track your expenses?: Yes  Make your own meals?: Yes    Do your own shopping?: Yes    Previous Hospitalizations:   Any hospitalizations or ED visits within the last 12 months?: No      Advance Care Planning:   Living will: Yes    Durable POA for healthcare: Yes    Advanced directive: Yes    Advanced directive counseling given: Yes      Cognitive Screening:   Provider or family/friend/caregiver concerned regarding cognition?: No    Preventive Screenings      Cardiovascular Screening:    General: Screening Not Indicated and History Lipid Disorder      Diabetes Screening:     General: Screening Current      Colorectal Cancer Screening:     General: Screening  Current      Breast Cancer Screening:     General: Risks and Benefits Discussed    Due for: Mammogram        Cervical Cancer Screening:    General: Screening Not Indicated      Osteoporosis Screening:    General: Risks and Benefits Discussed      Abdominal Aortic Aneurysm (AAA) Screening:        General: Screening Current      Lung Cancer Screening:     General: Screening Not Indicated      Hepatitis C Screening:    General: Screening Current    Immunizations:    - Risks/benefits immunizations discussed    - Immunizations given per orders      Screening, Brief Intervention, and Referral to Treatment (SBIRT)     Screening  Typical number of drinks in a day: 0  Typical number of drinks in a week: 0  Interpretation: Low risk drinking behavior.    AUDIT-C Screenin) How often did you have a drink containing alcohol in the past year? never  2) How many drinks did you have on a typical day when you were drinking in the past year? 0  3) How often did you have 6 or more drinks on one occasion in the past year? never    AUDIT-C Score: 0  Interpretation: Score 0-2 (female): Negative screen for alcohol misuse    Single Item Drug Screening:  How often have you used an illegal drug (including marijuana) or a prescription medication for non-medical reasons in the past year? never    Single Item Drug Screen Score: 0  Interpretation: Negative screen for possible drug use disorder    Social Drivers of Health     Financial Resource Strain: Low Risk  (2023)    Overall Financial Resource Strain (CARDIA)     Difficulty of Paying Living Expenses: Not hard at all   Food Insecurity: No Food Insecurity (2025)    Nursing - Inadequate Food Risk Classification     Worried About Running Out of Food in the Last Year: Never true     Ran Out of Food in the Last Year: Never true   Transportation Needs: No Transportation Needs (2025)    PRAPARE - Transportation     Lack of Transportation (Medical): No     Lack of Transportation  (Non-Medical): No   Housing Stability: Low Risk  (5/23/2025)    Housing Stability Vital Sign     Unable to Pay for Housing in the Last Year: No     Number of Times Moved in the Last Year: 0     Homeless in the Last Year: No   Utilities: Not At Risk (5/23/2025)    [facility] Utilities     Threatened with loss of utilities: No     No results found.    Objective   /64 (BP Location: Right arm, Patient Position: Sitting, Cuff Size: Standard)   Pulse 80   Temp (!) 96.7 °F (35.9 °C) (Tympanic)   Wt 59.4 kg (131 lb)   SpO2 96%   BMI 21.14 kg/m²     Physical Exam  Vitals and nursing note reviewed.   Constitutional:       General: She is not in acute distress.     Appearance: She is well-developed.   HENT:      Head: Normocephalic and atraumatic.     Eyes:      Conjunctiva/sclera: Conjunctivae normal.       Cardiovascular:      Rate and Rhythm: Normal rate and regular rhythm.      Heart sounds: No murmur heard.  Pulmonary:      Effort: Pulmonary effort is normal. No respiratory distress.      Breath sounds: Normal breath sounds.   Abdominal:      Palpations: Abdomen is soft.      Tenderness: There is no abdominal tenderness.     Musculoskeletal:         General: No swelling.      Cervical back: Neck supple.     Skin:     General: Skin is warm and dry.      Capillary Refill: Capillary refill takes less than 2 seconds.     Neurological:      Mental Status: She is alert.     Psychiatric:         Mood and Affect: Mood normal.

## 2025-06-21 ENCOUNTER — APPOINTMENT (OUTPATIENT)
Dept: LAB | Facility: MEDICAL CENTER | Age: 70
End: 2025-06-21
Payer: MEDICARE

## 2025-06-21 DIAGNOSIS — E03.9 ACQUIRED HYPOTHYROIDISM: ICD-10-CM

## 2025-06-21 DIAGNOSIS — E78.2 MIXED HYPERLIPIDEMIA: ICD-10-CM

## 2025-06-21 DIAGNOSIS — D51.9 ANEMIA DUE TO VITAMIN B12 DEFICIENCY, UNSPECIFIED B12 DEFICIENCY TYPE: ICD-10-CM

## 2025-06-21 DIAGNOSIS — I34.0 NONRHEUMATIC MITRAL VALVE REGURGITATION: ICD-10-CM

## 2025-06-21 DIAGNOSIS — H90.A31 MIXED CONDUCTIVE AND SENSORINEURAL HEARING LOSS OF RIGHT EAR WITH RESTRICTED HEARING OF LEFT EAR: ICD-10-CM

## 2025-06-21 LAB
ALBUMIN SERPL BCG-MCNC: 4 G/DL (ref 3.5–5)
ALP SERPL-CCNC: 87 U/L (ref 34–104)
ALT SERPL W P-5'-P-CCNC: 9 U/L (ref 7–52)
ANION GAP SERPL CALCULATED.3IONS-SCNC: 5 MMOL/L (ref 4–13)
AST SERPL W P-5'-P-CCNC: 19 U/L (ref 13–39)
BASOPHILS # BLD AUTO: 0.04 THOUSANDS/ÂΜL (ref 0–0.1)
BASOPHILS NFR BLD AUTO: 1 % (ref 0–1)
BILIRUB SERPL-MCNC: 0.52 MG/DL (ref 0.2–1)
BUN SERPL-MCNC: 13 MG/DL (ref 5–25)
CALCIUM SERPL-MCNC: 8.7 MG/DL (ref 8.4–10.2)
CHLORIDE SERPL-SCNC: 104 MMOL/L (ref 96–108)
CHOLEST SERPL-MCNC: 163 MG/DL (ref ?–200)
CO2 SERPL-SCNC: 31 MMOL/L (ref 21–32)
CREAT SERPL-MCNC: 0.58 MG/DL (ref 0.6–1.3)
EOSINOPHIL # BLD AUTO: 0.33 THOUSAND/ÂΜL (ref 0–0.61)
EOSINOPHIL NFR BLD AUTO: 5 % (ref 0–6)
ERYTHROCYTE [DISTWIDTH] IN BLOOD BY AUTOMATED COUNT: 11.7 % (ref 11.6–15.1)
GFR SERPL CREATININE-BSD FRML MDRD: 95 ML/MIN/1.73SQ M
GLUCOSE P FAST SERPL-MCNC: 101 MG/DL (ref 65–99)
HCT VFR BLD AUTO: 42.8 % (ref 34.8–46.1)
HDLC SERPL-MCNC: 60 MG/DL
HGB BLD-MCNC: 14.1 G/DL (ref 11.5–15.4)
IMM GRANULOCYTES # BLD AUTO: 0.02 THOUSAND/UL (ref 0–0.2)
IMM GRANULOCYTES NFR BLD AUTO: 0 % (ref 0–2)
LDLC SERPL CALC-MCNC: 89 MG/DL (ref 0–100)
LYMPHOCYTES # BLD AUTO: 1.26 THOUSANDS/ÂΜL (ref 0.6–4.47)
LYMPHOCYTES NFR BLD AUTO: 19 % (ref 14–44)
MCH RBC QN AUTO: 31.1 PG (ref 26.8–34.3)
MCHC RBC AUTO-ENTMCNC: 32.9 G/DL (ref 31.4–37.4)
MCV RBC AUTO: 95 FL (ref 82–98)
MONOCYTES # BLD AUTO: 0.81 THOUSAND/ÂΜL (ref 0.17–1.22)
MONOCYTES NFR BLD AUTO: 13 % (ref 4–12)
NEUTROPHILS # BLD AUTO: 4.03 THOUSANDS/ÂΜL (ref 1.85–7.62)
NEUTS SEG NFR BLD AUTO: 62 % (ref 43–75)
NRBC BLD AUTO-RTO: 0 /100 WBCS
PLATELET # BLD AUTO: 299 THOUSANDS/UL (ref 149–390)
PMV BLD AUTO: 9.4 FL (ref 8.9–12.7)
POTASSIUM SERPL-SCNC: 3.4 MMOL/L (ref 3.5–5.3)
PROT SERPL-MCNC: 6.8 G/DL (ref 6.4–8.4)
RBC # BLD AUTO: 4.53 MILLION/UL (ref 3.81–5.12)
SODIUM SERPL-SCNC: 140 MMOL/L (ref 135–147)
TRIGL SERPL-MCNC: 68 MG/DL (ref ?–150)
TSH SERPL DL<=0.05 MIU/L-ACNC: 4.36 UIU/ML (ref 0.45–4.5)
VIT B12 SERPL-MCNC: 459 PG/ML (ref 180–914)
WBC # BLD AUTO: 6.49 THOUSAND/UL (ref 4.31–10.16)

## 2025-06-21 PROCEDURE — 85025 COMPLETE CBC W/AUTO DIFF WBC: CPT

## 2025-06-21 PROCEDURE — 80061 LIPID PANEL: CPT

## 2025-06-21 PROCEDURE — 82607 VITAMIN B-12: CPT

## 2025-06-21 PROCEDURE — 84443 ASSAY THYROID STIM HORMONE: CPT

## 2025-06-21 PROCEDURE — 80053 COMPREHEN METABOLIC PANEL: CPT

## 2025-06-21 PROCEDURE — 36415 COLL VENOUS BLD VENIPUNCTURE: CPT

## 2025-06-24 ENCOUNTER — HOSPITAL ENCOUNTER (OUTPATIENT)
Dept: CT IMAGING | Facility: HOSPITAL | Age: 70
Discharge: HOME | End: 2025-06-24
Attending: PHYSICIAN ASSISTANT
Payer: MEDICARE

## 2025-06-24 ENCOUNTER — HOSPITAL ENCOUNTER (OUTPATIENT)
Dept: NON INVASIVE DIAGNOSTICS | Facility: HOSPITAL | Age: 70
Discharge: HOME | End: 2025-06-24
Attending: STUDENT IN AN ORGANIZED HEALTH CARE EDUCATION/TRAINING PROGRAM
Payer: MEDICARE

## 2025-06-24 VITALS
HEIGHT: 66 IN | DIASTOLIC BLOOD PRESSURE: 64 MMHG | SYSTOLIC BLOOD PRESSURE: 120 MMHG | BODY MASS INDEX: 21.05 KG/M2 | WEIGHT: 131 LBS | HEART RATE: 75 BPM

## 2025-06-24 DIAGNOSIS — R01.1 CARDIAC MURMUR: ICD-10-CM

## 2025-06-24 DIAGNOSIS — H90.A31 MIXED CONDUCTIVE AND SENSORINEURAL HEARING LOSS OF RIGHT EAR WITH RESTRICTED HEARING OF LEFT EAR: ICD-10-CM

## 2025-06-24 DIAGNOSIS — I34.0 NONRHEUMATIC MITRAL VALVE REGURGITATION: ICD-10-CM

## 2025-06-24 PROCEDURE — 93306 TTE W/DOPPLER COMPLETE: CPT | Performed by: INTERNAL MEDICINE

## 2025-06-24 PROCEDURE — 93306 TTE W/DOPPLER COMPLETE: CPT

## 2025-06-24 PROCEDURE — 70480 CT ORBIT/EAR/FOSSA W/O DYE: CPT

## 2025-06-27 LAB
AORTIC ROOT: 2.6 CM
AORTIC VALVE MEAN VELOCITY: 10.1 M/S
AV AREA BY CONTINUOUS VTI: 2 CM2
AV AREA PEAK VELOCITY: 2 CM2
AV LVOT MEAN GRADIENT: 3 MMHG
AV LVOT PEAK GRADIENT: 3 MMHG
AV MEAN PRESS GRAD SYS DOP V1V2: 4 MMHG
AV ORIFICE AREA US: 2.01 CM2
AV PEAK GRADIENT: 7 MMHG
AV VELOCITY RATIO: 0.71
AV VMAX SYS DOP: 1.33 M/S
BSA FOR ECHO PROCEDURE: 1.67 M2
DOP CALC AO VTI: 29.31 CM
DOP CALC LVOT AREA: 2.83 CM2
DOP CALC LVOT CARDIAC INDEX: 2.64 L/MIN/M2
DOP CALC LVOT CARDIAC OUTPUT: 4.41 L/MIN
DOP CALC LVOT DIAMETER: 1.9 CM
DOP CALC LVOT PEAK VEL VTI: 20.84 CM
DOP CALC LVOT PEAK VEL: 0.93 M/S
DOP CALC LVOT STROKE INDEX: 35.9 ML/M2
DOP CALC LVOT STROKE VOLUME: 59.06
DOP CALC MV VTI: 39.08 CM
E WAVE DECELERATION TIME: 269 MS
E/A RATIO: 0.91
FRACTIONAL SHORTENING: 35 (ref 28–44)
INTERVENTRICULAR SEPTUM IN DIASTOLE (PARASTERNAL SHORT AXIS VIEW): 1.1 CM
INTERVENTRICULAR SEPTUM: 1.1 CM (ref 0.6–1.1)
LAAS-AP2: 11.9 CM2
LAAS-AP4: 15.5 CM2
LEFT ATRIUM AREA SYSTOLE SINGLE PLANE A4C: 14.8 CM2
LEFT ATRIUM SIZE: 2.6 CM
LEFT ATRIUM VOLUME (MOD BIPLANE): 35 ML
LEFT ATRIUM VOLUME INDEX (MOD BIPLANE): 21 ML/M2
LEFT INTERNAL DIMENSION IN SYSTOLE: 2.2 CM (ref 2.1–4)
LEFT VENTRICULAR INTERNAL DIMENSION IN DIASTOLE: 3.4 CM (ref 3.5–6)
LEFT VENTRICULAR POSTERIOR WALL IN END DIASTOLE: 1.1 CM
LEFT VENTRICULAR STROKE VOLUME: 29 ML
LV EF US.2D.A4C+ESTIMATED: 60 %
LVSV (TEICH): 29 ML
MV E'TISSUE VEL-LAT: 6 CM/S
MV E'TISSUE VEL-SEP: 7 CM/S
MV MEAN GRADIENT: 5 MMHG
MV PEAK A VEL: 1.25 M/S
MV PEAK E VEL: 114 CM/S
MV PEAK GRADIENT: 8 MMHG
MV STENOSIS PRESSURE HALF TIME: 78 MS
MV VALVE AREA BY CONTINUITY EQUATION: 1.51 CM2
MV VALVE AREA P 1/2 METHOD: 2.82
RA PRESSURE ESTIMATED: 8 MMHG
RIGHT ATRIUM AREA SYSTOLE A4C: 12.7 CM2
RIGHT VENTRICLE ID DIMENSION: 3.1 CM
RV PSP: 39 MMHG
SL CV LEFT ATRIUM LENGTH A2C: 3.8 CM
SL CV LV EF: 70
SL CV PED ECHO LEFT VENTRICLE DIASTOLIC VOLUME (MOD BIPLANE) 2D: 46 ML
SL CV PED ECHO LEFT VENTRICLE SYSTOLIC VOLUME (MOD BIPLANE) 2D: 17 ML
TR MAX PG: 31 MMHG
TR PEAK VELOCITY: 2.8 M/S
TRICUSPID ANNULAR PLANE SYSTOLIC EXCURSION: 2.5 CM
TRICUSPID VALVE PEAK REGURGITATION VELOCITY: 2.79 M/S

## 2025-06-28 ENCOUNTER — HOSPITAL ENCOUNTER (EMERGENCY)
Facility: HOSPITAL | Age: 70
Discharge: HOME | End: 2025-06-28
Attending: EMERGENCY MEDICINE | Admitting: EMERGENCY MEDICINE
Payer: MEDICARE

## 2025-06-28 ENCOUNTER — APPOINTMENT (EMERGENCY)
Dept: CT IMAGING | Facility: HOSPITAL | Age: 70
End: 2025-06-28
Payer: MEDICARE

## 2025-06-28 VITALS
TEMPERATURE: 98.1 F | RESPIRATION RATE: 18 BRPM | SYSTOLIC BLOOD PRESSURE: 179 MMHG | OXYGEN SATURATION: 99 % | HEART RATE: 75 BPM | DIASTOLIC BLOOD PRESSURE: 77 MMHG

## 2025-06-28 DIAGNOSIS — R31.9 HEMATURIA: ICD-10-CM

## 2025-06-28 DIAGNOSIS — M54.9 BACK PAIN: Primary | ICD-10-CM

## 2025-06-28 LAB
ALBUMIN SERPL BCG-MCNC: 4.4 G/DL (ref 3.5–5)
ALP SERPL-CCNC: 98 U/L (ref 34–104)
ALT SERPL W P-5'-P-CCNC: 9 U/L (ref 7–52)
ANION GAP SERPL CALCULATED.3IONS-SCNC: 9 MMOL/L (ref 4–13)
AST SERPL W P-5'-P-CCNC: 16 U/L (ref 13–39)
ATRIAL RATE: 80 BPM
BACTERIA UR QL AUTO: ABNORMAL /HPF
BASOPHILS # BLD AUTO: 0.03 THOUSANDS/ÂΜL (ref 0–0.1)
BASOPHILS NFR BLD AUTO: 0 % (ref 0–1)
BILIRUB DIRECT SERPL-MCNC: 0.15 MG/DL (ref 0–0.2)
BILIRUB SERPL-MCNC: 0.63 MG/DL (ref 0.2–1)
BILIRUB UR QL STRIP: NEGATIVE
BUN SERPL-MCNC: 9 MG/DL (ref 5–25)
CALCIUM SERPL-MCNC: 9.3 MG/DL (ref 8.4–10.2)
CHLORIDE SERPL-SCNC: 103 MMOL/L (ref 96–108)
CLARITY UR: CLEAR
CO2 SERPL-SCNC: 27 MMOL/L (ref 21–32)
COLOR UR: ABNORMAL
CREAT SERPL-MCNC: 0.59 MG/DL (ref 0.6–1.3)
EOSINOPHIL # BLD AUTO: 0.17 THOUSAND/ÂΜL (ref 0–0.61)
EOSINOPHIL NFR BLD AUTO: 3 % (ref 0–6)
ERYTHROCYTE [DISTWIDTH] IN BLOOD BY AUTOMATED COUNT: 11.7 % (ref 11.6–15.1)
GFR SERPL CREATININE-BSD FRML MDRD: 94 ML/MIN/1.73SQ M
GLUCOSE SERPL-MCNC: 200 MG/DL (ref 65–140)
GLUCOSE UR STRIP-MCNC: ABNORMAL MG/DL
HCT VFR BLD AUTO: 44.6 % (ref 34.8–46.1)
HGB BLD-MCNC: 15 G/DL (ref 11.5–15.4)
HGB UR QL STRIP.AUTO: ABNORMAL
IMM GRANULOCYTES # BLD AUTO: 0.03 THOUSAND/UL (ref 0–0.2)
IMM GRANULOCYTES NFR BLD AUTO: 0 % (ref 0–2)
KETONES UR STRIP-MCNC: NEGATIVE MG/DL
LEUKOCYTE ESTERASE UR QL STRIP: NEGATIVE
LIPASE SERPL-CCNC: 6 U/L (ref 11–82)
LYMPHOCYTES # BLD AUTO: 0.98 THOUSANDS/ÂΜL (ref 0.6–4.47)
LYMPHOCYTES NFR BLD AUTO: 15 % (ref 14–44)
MCH RBC QN AUTO: 30.7 PG (ref 26.8–34.3)
MCHC RBC AUTO-ENTMCNC: 33.6 G/DL (ref 31.4–37.4)
MCV RBC AUTO: 91 FL (ref 82–98)
MONOCYTES # BLD AUTO: 0.59 THOUSAND/ÂΜL (ref 0.17–1.22)
MONOCYTES NFR BLD AUTO: 9 % (ref 4–12)
NEUTROPHILS # BLD AUTO: 4.93 THOUSANDS/ÂΜL (ref 1.85–7.62)
NEUTS SEG NFR BLD AUTO: 73 % (ref 43–75)
NITRITE UR QL STRIP: NEGATIVE
NON-SQ EPI CELLS URNS QL MICRO: ABNORMAL /HPF
NRBC BLD AUTO-RTO: 0 /100 WBCS
P AXIS: 71 DEGREES
PH UR STRIP.AUTO: 7 [PH]
PLATELET # BLD AUTO: 294 THOUSANDS/UL (ref 149–390)
PMV BLD AUTO: 9 FL (ref 8.9–12.7)
POTASSIUM SERPL-SCNC: 3.2 MMOL/L (ref 3.5–5.3)
PR INTERVAL: 210 MS
PROT SERPL-MCNC: 7.2 G/DL (ref 6.4–8.4)
PROT UR STRIP-MCNC: NEGATIVE MG/DL
QRS AXIS: 99 DEGREES
QRSD INTERVAL: 88 MS
QT INTERVAL: 408 MS
QTC INTERVAL: 470 MS
RBC # BLD AUTO: 4.89 MILLION/UL (ref 3.81–5.12)
RBC #/AREA URNS AUTO: ABNORMAL /HPF
SODIUM SERPL-SCNC: 139 MMOL/L (ref 135–147)
SP GR UR STRIP.AUTO: 1.01 (ref 1–1.03)
T WAVE AXIS: 74 DEGREES
UROBILINOGEN UR STRIP-ACNC: <2 MG/DL
VENTRICULAR RATE: 80 BPM
WBC # BLD AUTO: 6.73 THOUSAND/UL (ref 4.31–10.16)
WBC #/AREA URNS AUTO: ABNORMAL /HPF

## 2025-06-28 PROCEDURE — 96365 THER/PROPH/DIAG IV INF INIT: CPT

## 2025-06-28 PROCEDURE — 36415 COLL VENOUS BLD VENIPUNCTURE: CPT

## 2025-06-28 PROCEDURE — 85025 COMPLETE CBC W/AUTO DIFF WBC: CPT

## 2025-06-28 PROCEDURE — 96375 TX/PRO/DX INJ NEW DRUG ADDON: CPT

## 2025-06-28 PROCEDURE — 80076 HEPATIC FUNCTION PANEL: CPT

## 2025-06-28 PROCEDURE — 81001 URINALYSIS AUTO W/SCOPE: CPT

## 2025-06-28 PROCEDURE — 93010 ELECTROCARDIOGRAM REPORT: CPT | Performed by: INTERNAL MEDICINE

## 2025-06-28 PROCEDURE — 80048 BASIC METABOLIC PNL TOTAL CA: CPT

## 2025-06-28 PROCEDURE — 99284 EMERGENCY DEPT VISIT MOD MDM: CPT

## 2025-06-28 PROCEDURE — 83690 ASSAY OF LIPASE: CPT

## 2025-06-28 PROCEDURE — 74177 CT ABD & PELVIS W/CONTRAST: CPT

## 2025-06-28 PROCEDURE — 99285 EMERGENCY DEPT VISIT HI MDM: CPT

## 2025-06-28 PROCEDURE — 93005 ELECTROCARDIOGRAM TRACING: CPT

## 2025-06-28 RX ORDER — ONDANSETRON 2 MG/ML
4 INJECTION INTRAMUSCULAR; INTRAVENOUS ONCE
Status: DISCONTINUED | OUTPATIENT
Start: 2025-06-28 | End: 2025-06-28

## 2025-06-28 RX ORDER — ACETAMINOPHEN 325 MG/1
975 TABLET ORAL ONCE
Status: COMPLETED | OUTPATIENT
Start: 2025-06-28 | End: 2025-06-28

## 2025-06-28 RX ORDER — POTASSIUM CHLORIDE 1500 MG/1
40 TABLET, EXTENDED RELEASE ORAL ONCE
Status: COMPLETED | OUTPATIENT
Start: 2025-06-28 | End: 2025-06-28

## 2025-06-28 RX ORDER — METHOCARBAMOL 500 MG/1
250 TABLET, FILM COATED ORAL ONCE
Status: COMPLETED | OUTPATIENT
Start: 2025-06-28 | End: 2025-06-28

## 2025-06-28 RX ORDER — KETOROLAC TROMETHAMINE 30 MG/ML
15 INJECTION, SOLUTION INTRAMUSCULAR; INTRAVENOUS ONCE
Status: COMPLETED | OUTPATIENT
Start: 2025-06-28 | End: 2025-06-28

## 2025-06-28 RX ORDER — METHOCARBAMOL 500 MG/1
500 TABLET, FILM COATED ORAL 2 TIMES DAILY
Qty: 20 TABLET | Refills: 0 | Status: SHIPPED | OUTPATIENT
Start: 2025-06-28

## 2025-06-28 RX ADMIN — IOHEXOL 100 ML: 350 INJECTION, SOLUTION INTRAVENOUS at 09:33

## 2025-06-28 RX ADMIN — SODIUM CHLORIDE, SODIUM LACTATE, POTASSIUM CHLORIDE, AND CALCIUM CHLORIDE 1000 ML: .6; .31; .03; .02 INJECTION, SOLUTION INTRAVENOUS at 10:14

## 2025-06-28 RX ADMIN — KETOROLAC TROMETHAMINE 15 MG: 30 INJECTION, SOLUTION INTRAMUSCULAR; INTRAVENOUS at 10:12

## 2025-06-28 RX ADMIN — METHOCARBAMOL 250 MG: 500 TABLET ORAL at 11:43

## 2025-06-28 RX ADMIN — ACETAMINOPHEN 975 MG: 325 TABLET ORAL at 10:46

## 2025-06-28 RX ADMIN — POTASSIUM CHLORIDE 40 MEQ: 1500 TABLET, EXTENDED RELEASE ORAL at 09:06

## 2025-06-28 NOTE — ED PROVIDER NOTES
Time reflects when diagnosis was documented in both MDM as applicable and the Disposition within this note       Time User Action Codes Description Comment    6/28/2025 11:37 AM Dallas Gtz Add [M54.9] Back pain     6/28/2025 11:37 AM Dallas Gtz Add [R31.9] Hematuria           ED Disposition       ED Disposition   Discharge    Condition   Stable    Date/Time   Sat Jun 28, 2025 11:37 AM    Comment   Colt Lucerocindy discharge to home/self care.                   Assessment & Plan       Medical Decision Making  DDx including but not limited to: renal colic, pyelonephritis, UTI    Pt has blood in her urine but does not appear infected. CT scan had contrast delay because of pt vomiting during the CT scan so hard to see if there was any renal stone. However no hydro noted on CT. Even there was a kidney stone there would not be anything to do for it besides send home with pain control. However will refer to urology for hematuria. Will send home with robaxin per pt's request.     I have discussed the plan to discharge pt from ED. The patient was discharged in stable condition.  Patient ambulated off the department.  Extensive return to emergency department precautions were discussed.  Follow up with appropriate providers including primary care physician was discussed.  Patient and/or their  primary decision maker expressed understanding.  Patient remained stable during entire emergency department stay.      Amount and/or Complexity of Data Reviewed  Labs: ordered. Decision-making details documented in ED Course.  Radiology: ordered. Decision-making details documented in ED Course.    Risk  OTC drugs.  Prescription drug management.        ED Course as of 06/28/25 1155   Sat Jun 28, 2025   0830 Blood, UA(!): Small   0830 Glucose, UA(!): 100 (1/10%)   0830 Potassium(!): 3.2   0830 GLUCOSE(!): 200   0835 RBC Urine(!): 4-10   0836 Bacteria, UA: None Seen   1121 CT abdomen pelvis with contrast  No acute findings in the  abdomen or pelvis.     Due to excretory phase of contrast, difficult to assess for urinary tract calculi. No hydronephrosis.     1136 Discussed results with the patient. She reports that last time she had a similar pain a muscle relaxer helped. Will give 1/2 dose per pt's request       Medications   potassium chloride (Klor-Con M20) CR tablet 40 mEq (40 mEq Oral Given 6/28/25 0906)   iohexol (OMNIPAQUE) 350 MG/ML injection (MULTI-DOSE) 100 mL (100 mL Intravenous Given 6/28/25 0933)   lactated ringers bolus 1,000 mL (0 mL Intravenous Stopped 6/28/25 1114)   ketorolac (TORADOL) injection 15 mg (15 mg Intravenous Given 6/28/25 1012)   acetaminophen (TYLENOL) tablet 975 mg (975 mg Oral Given 6/28/25 1046)   methocarbamol (ROBAXIN) tablet 250 mg (250 mg Oral Given 6/28/25 1143)       ED Risk Strat Scores                    No data recorded                            History of Present Illness       Chief Complaint   Patient presents with    Back Pain     Patient complaining of left upper shooting back pain since Thursday. States it takes her breath away. Now reporting nausea, intermittent lightheadedness, and frequent urination that started throughout the night.        Past Medical History[1]   Past Surgical History[2]   Family History[3]   Social History[4]   E-Cigarette/Vaping    E-Cigarette Use Never User       E-Cigarette/Vaping Substances    Nicotine No     THC No     CBD No     Flavoring No     Other No     Unknown No       I have reviewed and agree with the history as documented.     70 YOF with PMH hypothyroidism, HLD, mitral valve regurg presents today with complaint of left sided back pain since Thursday. Unsure if she woke up with the pain or something specifically caused it. Reports it is near the bottom of the shoulder blade. States it does not get worse with deep breaths or movement. States it is sharp in nature when it does occur and takes her breath away. Pt also reporting last night she had urinary  frequency. Reports occasionally when she would stand up from lying she would get dizzy- described as off balance. Denies fever, chills, chest pain, SOB, abd pain, nausea, vomiting, diarrhea, dysuria, hematuria.         Review of Systems   Constitutional:  Negative for chills and fever.   Respiratory:  Negative for shortness of breath.    Cardiovascular:  Negative for chest pain and palpitations.   Gastrointestinal:  Negative for abdominal pain, diarrhea, nausea and vomiting.   Genitourinary:  Positive for frequency. Negative for dysuria, hematuria and urgency.   Musculoskeletal:  Positive for back pain.           Objective       ED Triage Vitals   Temperature Pulse Blood Pressure Respirations SpO2 Patient Position - Orthostatic VS   06/28/25 0730 06/28/25 0730 06/28/25 0730 06/28/25 0730 06/28/25 0730 --   98.1 °F (36.7 °C) 83 (!) 196/90 18 95 %       Temp Source Heart Rate Source BP Location FiO2 (%) Pain Score    06/28/25 0730 -- -- -- 06/28/25 1012    Oral    6      Vitals      Date and Time Temp Pulse SpO2 Resp BP Pain Score FACES Pain Rating User   06/28/25 1115 -- 75 99 % 18 -- -- --    06/28/25 1046 -- -- -- -- -- 6 --    06/28/25 1012 -- -- -- -- -- 6 --    06/28/25 0900 -- 74 97 % 15 179/77 -- --    06/28/25 0800 -- 75 95 % 15 178/88 -- --    06/28/25 0730 98.1 °F (36.7 °C) 83 95 % 18 196/90 -- --             Physical Exam  Vitals and nursing note reviewed.   Constitutional:       General: She is not in acute distress.     Appearance: Normal appearance. She is well-developed.   HENT:      Head: Normocephalic and atraumatic.     Eyes:      Conjunctiva/sclera: Conjunctivae normal.       Cardiovascular:      Rate and Rhythm: Normal rate and regular rhythm.      Heart sounds: No murmur heard.  Pulmonary:      Effort: Pulmonary effort is normal.   Abdominal:      Palpations: Abdomen is soft.      Tenderness: There is no abdominal tenderness.     Musculoskeletal:         General: No swelling. Normal  range of motion.      Cervical back: Normal range of motion and neck supple.     Skin:     General: Skin is warm and dry.      Capillary Refill: Capillary refill takes less than 2 seconds.     Neurological:      Mental Status: She is alert.     Psychiatric:         Mood and Affect: Mood normal.         Behavior: Behavior normal.         Results Reviewed       Procedure Component Value Units Date/Time    Urine Microscopic [[id number]]  (Abnormal) Collected: 06/28/25 0818    Lab Status: Final result Specimen: Urine, Clean Catch Updated: 06/28/25 0831     RBC, UA 4-10 /hpf      WBC, UA None Seen /hpf      Epithelial Cells None Seen /hpf      Bacteria, UA None Seen /hpf     Basic metabolic panel [[id number]]  (Abnormal) Collected: 06/28/25 0807    Lab Status: Final result Specimen: Blood from Arm, Left Updated: 06/28/25 0827     Sodium 139 mmol/L      Potassium 3.2 mmol/L      Chloride 103 mmol/L      CO2 27 mmol/L      ANION GAP 9 mmol/L      BUN 9 mg/dL      Creatinine 0.59 mg/dL      Glucose 200 mg/dL      Calcium 9.3 mg/dL      eGFR 94 ml/min/1.73sq m     Narrative:      National Kid[employer] Foundation guidelines for Chronic Kidney Disease (CKD):     Stage 1 with normal or high GFR (GFR > 90 mL/min/1.73 square meters)    Stage 2 Mild CKD (GFR = 60-89 mL/min/1.73 square meters)    Stage 3A Moderate CKD (GFR = 45-59 mL/min/1.73 square meters)    Stage 3B Moderate CKD (GFR = 30-44 mL/min/1.73 square meters)    Stage 4 Severe CKD (GFR = 15-29 mL/min/1.73 square meters)    Stage 5 End Stage CKD (GFR <15 mL/min/1.73 square meters)  Note: GFR calculation is accurate only with a steady state creatinine    Lipase [[id number]]  (Abnormal) Collected: 06/28/25 0807    Lab Status: Final result Specimen: Blood from Arm, Left Updated: 06/28/25 0827     Lipase 6 u/L     Hepatic function panel [[id number]]  (Normal) Collected: 06/28/25 0807    Lab Status: Final result Specimen: Blood from Arm, Left Updated: 06/28/25 0827     Total  Bilirubin 0.63 mg/dL      Bilirubin, Direct 0.15 mg/dL      Alkaline Phosphatase 98 U/L      AST 16 U/L      ALT 9 U/L      Total Protein 7.2 g/dL      Albumin 4.4 g/dL     UA w Reflex to Microscopic w Reflex to Culture [[id number]]  (Abnormal) Collected: 06/28/25 0818    Lab Status: Final result Specimen: Urine, Clean Catch Updated: 06/28/25 0827     Color, UA Light Yellow     Clarity, UA Clear     Specific Gravity, UA 1.010     pH, UA 7.0     Leukocytes, UA Negative     Nitrite, UA Negative     Protein, UA Negative mg/dl      Glucose,  (1/10%) mg/dl      Ketones, UA Negative mg/dl      Urobilinogen, UA <2.0 mg/dl      Bilirubin, UA Negative     Occult Blood, UA Small    CBC and differential [[id number]] Collected: 06/28/25 0807    Lab Status: Final result Specimen: Blood from Arm, Left Updated: 06/28/25 0814     WBC 6.73 Thousand/uL      RBC 4.89 Million/uL      Hemoglobin 15.0 g/dL      Hematocrit 44.6 %      MCV 91 fL      MCH 30.7 pg      MCHC 33.6 g/dL      RDW 11.7 %      MPV 9.0 fL      Platelets 294 Thousands/uL      nRBC 0 /100 WBCs      Segmented % 73 %      Immature Grans % 0 %      Lymphocytes % 15 %      Monocytes % 9 %      Eosinophils Relative 3 %      Basophils Relative 0 %      Absolute Neutrophils 4.93 Thousands/µL      Absolute Immature Grans 0.03 Thousand/uL      Absolute Lymphocytes 0.98 Thousands/µL      Absolute Monocytes 0.59 Thousand/µL      Eosinophils Absolute 0.17 Thousand/µL      Basophils Absolute 0.03 Thousands/µL             CT abdomen pelvis with contrast   Final Interpretation by Nabil Victoria MD (06/28 1117)      No acute findings in the abdomen or pelvis.      Due to excretory phase of contrast, difficult to assess for urinary tract calculi. No hydronephrosis.         Resident: Bin Fox I, the attending radiologist, have reviewed the images and agree with the final report above.      Workstation performed: [id number]             ECG 12 Lead Documentation  Only    Date/Time: 6/28/2025 7:46 AM    Performed by: Dallas Gtz PA-C  Authorized by: Dallas Gtz PA-C    Indications / Diagnosis:  Dizziness  ECG reviewed by me, the ED Provider: yes    Patient location:  ED  Previous ECG:     Previous ECG:  Compared to current  Interpretation:     Interpretation: normal    Rate:     ECG rate:  80    ECG rate assessment: normal    Rhythm:     Rhythm: sinus rhythm    Ectopy:     Ectopy: none    QRS:     QRS axis:  Normal    QRS intervals:  Normal  Conduction:     Conduction: normal    ST segments:     ST segments:  Normal  T waves:     T waves: normal        ED Medication and Procedure Management   Prior to Admission Medications   Prescriptions Last Dose Informant Patient Reported? Taking?   atorvastatin (LIPITOR) 10 mg tablet   No No   Sig: TAKE 1 TABLET BY MOUTH EVERY DAY   cholecalciferol (VITAMIN D3) 400 units tablet  Self Yes No   Sig: Take 400 Units by mouth in the morning.   cyanocobalamin (VITAMIN B-12) 100 mcg tablet  Self Yes No   Sig: Take by mouth in the morning.   levothyroxine 75 mcg tablet   No No   Sig: TAKE 1 TABLET (75 MCG TOTAL) BY MOUTH DAILY IN THE EARLY MORNING      Facility-Administered Medications: None     Patient's Medications   Discharge Prescriptions    METHOCARBAMOL (ROBAXIN) 500 MG TABLET    Take 1 tablet (500 mg total) by mouth 2 (two) times a day       Start Date: 6/28/2025 End Date: --       Order Dose: 500 mg       Quantity: 20 tablet    Refills: 0       ED SEPSIS DOCUMENTATION   Time reflects when diagnosis was documented in both MDM as applicable and the Disposition within this note       Time User Action Codes Description Comment    6/28/2025 11:37 AM Dallas Gtz Add [M54.9] Back pain     6/28/2025 11:37 AM Dallas Gtz Add [R31.9] Hematuria                      [1]   Past Medical History:  Diagnosis Date    Anemia     Arthritis     Disease of thyroid gland 2007    Heart murmur about 2010    mitral valve regurgitation  (mild)    Hyperlipidemia     Hypothyroid     Nephrolithiasis 01/24/2024    Nosebleed 1960    only as a child    Scoliosis scoliosis - spinal fusion 1973   [2]   Past Surgical History:  Procedure Laterality Date    SPINE SURGERY  6/21/1973    scoliosis repair with kelvin in spine - age 18   [3]   Family History  Problem Relation Name Age of Onset    Thyroid disease Mother Kathy     Coronary artery disease Mother Kathy     Anxiety disorder Mother Kathy     Heart failure Mother Kathy     Heart disease Father Vinicio     Stroke Father Vinicio    [4]   Social History  Tobacco Use    Smoking status: Never    Smokeless tobacco: Never   Vaping Use    Vaping status: Never Used   Substance Use Topics    Alcohol use: Not Currently    Drug use: Never        Dallas Gtz PA-C  06/28/25 9415

## 2025-07-02 ENCOUNTER — OFFICE VISIT (OUTPATIENT)
Dept: FAMILY MEDICINE CLINIC | Facility: CLINIC | Age: 70
End: 2025-07-02
Payer: MEDICARE

## 2025-07-02 VITALS
SYSTOLIC BLOOD PRESSURE: 130 MMHG | OXYGEN SATURATION: 95 % | WEIGHT: 129 LBS | BODY MASS INDEX: 20.82 KG/M2 | TEMPERATURE: 96.1 F | HEART RATE: 80 BPM | DIASTOLIC BLOOD PRESSURE: 78 MMHG

## 2025-07-02 DIAGNOSIS — R31.29 MICROSCOPIC HEMATURIA: ICD-10-CM

## 2025-07-02 DIAGNOSIS — M54.6 THORACIC SPINE PAIN: Primary | ICD-10-CM

## 2025-07-02 PROCEDURE — G2211 COMPLEX E/M VISIT ADD ON: HCPCS | Performed by: FAMILY MEDICINE

## 2025-07-02 PROCEDURE — 99213 OFFICE O/P EST LOW 20 MIN: CPT | Performed by: FAMILY MEDICINE

## 2025-07-02 NOTE — PROGRESS NOTES
Name: Colt Palafox      : 1955      MRN: [id number]  Encounter Provider: James Cobb MD  Encounter Date: 2025   Encounter department: [facility]  :  Assessment & Plan  Thoracic spine pain    Likely muscle spasm, improving on robaxin, if not resolving check xray and refer to PT.       Microscopic hematuria    Patient was referred to urology in ER and has upcoming appointment, recent CT scan results reviewed.              History of Present Illness   HPI    70 yaer old presenting for left sided flank pain radiating to left shoulder.    Went to the emergency room, treated for muscle spasm, did have hematuria and was referred to urology.    Patient vomited contrast from CT scan in Er    Pain occurred after maybe overdoing exercise    Has seen urology in the past for kidney stones and hematuria    Review of Systems   Constitutional:  Negative for activity change and appetite change.   Respiratory:  Negative for apnea and chest tightness.    Gastrointestinal:  Negative for abdominal distention, abdominal pain and anal bleeding.   Musculoskeletal:  Negative for arthralgias and back pain.       Objective   /78 (BP Location: Left arm, Patient Position: Sitting, Cuff Size: Standard)   Pulse 80   Temp (!) 96.1 °F (35.6 °C) (Tympanic)   Wt 58.5 kg (129 lb)   SpO2 95%   BMI 20.82 kg/m²      Physical Exam  Constitutional:       Appearance: Normal appearance.     Cardiovascular:      Rate and Rhythm: Normal rate and regular rhythm.   Pulmonary:      Effort: Pulmonary effort is normal.      Breath sounds: Normal breath sounds.     Musculoskeletal:        Arms:       Comments: Tender in circled area     Neurological:      Mental Status: She is alert.

## 2025-07-29 ENCOUNTER — HOSPITAL ENCOUNTER (OUTPATIENT)
Dept: ULTRASOUND IMAGING | Facility: MEDICAL CENTER | Age: 70
Discharge: HOME | End: 2025-07-29
Payer: MEDICARE

## 2025-07-29 DIAGNOSIS — N28.1 RENAL CYST: ICD-10-CM

## 2025-07-29 PROCEDURE — 76775 US EXAM ABDO BACK WALL LIM: CPT

## 2025-08-05 ENCOUNTER — OFFICE VISIT (OUTPATIENT)
Dept: CARDIOLOGY CLINIC | Facility: CLINIC | Age: 70
End: 2025-08-05
Payer: MEDICARE

## 2025-08-05 VITALS
HEIGHT: 66 IN | BODY MASS INDEX: 20.96 KG/M2 | SYSTOLIC BLOOD PRESSURE: 124 MMHG | DIASTOLIC BLOOD PRESSURE: 80 MMHG | HEART RATE: 68 BPM | OXYGEN SATURATION: 97 % | WEIGHT: 130.4 LBS

## 2025-08-05 DIAGNOSIS — R01.1 CARDIAC MURMUR: ICD-10-CM

## 2025-08-05 DIAGNOSIS — I34.0 NONRHEUMATIC MITRAL VALVE REGURGITATION: Primary | ICD-10-CM

## 2025-08-05 DIAGNOSIS — E78.2 MIXED HYPERLIPIDEMIA: ICD-10-CM

## 2025-08-05 DIAGNOSIS — Z82.49 FAMILY HISTORY OF CORONARY ARTERY DISEASE: ICD-10-CM

## 2025-08-05 PROCEDURE — 99214 OFFICE O/P EST MOD 30 MIN: CPT | Performed by: STUDENT IN AN ORGANIZED HEALTH CARE EDUCATION/TRAINING PROGRAM

## 2025-08-05 PROCEDURE — G2211 COMPLEX E/M VISIT ADD ON: HCPCS | Performed by: STUDENT IN AN ORGANIZED HEALTH CARE EDUCATION/TRAINING PROGRAM

## 2025-08-14 ENCOUNTER — OFFICE VISIT (OUTPATIENT)
Dept: UROLOGY | Facility: CLINIC | Age: 70
End: 2025-08-14
Payer: MEDICARE

## 2025-08-14 ENCOUNTER — PATIENT MESSAGE (OUTPATIENT)
Dept: FAMILY MEDICINE CLINIC | Facility: CLINIC | Age: 70
End: 2025-08-14

## 2025-08-14 PROBLEM — R31.0 GROSS HEMATURIA: Status: ACTIVE | Noted: 2025-08-14

## 2025-08-14 PROBLEM — R31.9 HEMATURIA: Status: ACTIVE | Noted: 2025-08-14

## 2025-08-20 ENCOUNTER — EVALUATION (OUTPATIENT)
Dept: PHYSICAL THERAPY | Facility: MEDICAL CENTER | Age: 70
End: 2025-08-20
Attending: FAMILY MEDICINE
Payer: MEDICARE

## 2025-08-20 DIAGNOSIS — M54.6 THORACIC SPINE PAIN: Primary | ICD-10-CM

## 2025-08-20 PROCEDURE — 97110 THERAPEUTIC EXERCISES: CPT

## 2025-08-20 PROCEDURE — 97112 NEUROMUSCULAR REEDUCATION: CPT

## 2025-08-22 PROCEDURE — 97161 PT EVAL LOW COMPLEX 20 MIN: CPT
